# Patient Record
Sex: FEMALE | Race: WHITE | NOT HISPANIC OR LATINO
[De-identification: names, ages, dates, MRNs, and addresses within clinical notes are randomized per-mention and may not be internally consistent; named-entity substitution may affect disease eponyms.]

---

## 2018-04-10 PROBLEM — Z00.00 ENCOUNTER FOR PREVENTIVE HEALTH EXAMINATION: Status: ACTIVE | Noted: 2018-04-10

## 2018-05-04 ENCOUNTER — APPOINTMENT (OUTPATIENT)
Dept: OTOLARYNGOLOGY | Facility: CLINIC | Age: 58
End: 2018-05-04

## 2018-06-20 ENCOUNTER — APPOINTMENT (OUTPATIENT)
Dept: OTOLARYNGOLOGY | Facility: CLINIC | Age: 58
End: 2018-06-20

## 2023-04-10 ENCOUNTER — NON-APPOINTMENT (OUTPATIENT)
Age: 63
End: 2023-04-10

## 2023-04-10 ENCOUNTER — APPOINTMENT (OUTPATIENT)
Dept: BREAST CENTER | Facility: CLINIC | Age: 63
End: 2023-04-10
Payer: COMMERCIAL

## 2023-04-10 VITALS — OXYGEN SATURATION: 94 % | SYSTOLIC BLOOD PRESSURE: 139 MMHG | HEART RATE: 108 BPM | DIASTOLIC BLOOD PRESSURE: 76 MMHG

## 2023-04-10 DIAGNOSIS — Z86.79 PERSONAL HISTORY OF OTHER DISEASES OF THE CIRCULATORY SYSTEM: ICD-10-CM

## 2023-04-10 DIAGNOSIS — Z86.69 PERSONAL HISTORY OF OTHER DISEASES OF THE NERVOUS SYSTEM AND SENSE ORGANS: ICD-10-CM

## 2023-04-10 DIAGNOSIS — Z86.59 PERSONAL HISTORY OF OTHER MENTAL AND BEHAVIORAL DISORDERS: ICD-10-CM

## 2023-04-10 DIAGNOSIS — Z80.3 FAMILY HISTORY OF MALIGNANT NEOPLASM OF BREAST: ICD-10-CM

## 2023-04-10 DIAGNOSIS — Z78.9 OTHER SPECIFIED HEALTH STATUS: ICD-10-CM

## 2023-04-10 PROCEDURE — 99204 OFFICE O/P NEW MOD 45 MIN: CPT

## 2023-04-10 NOTE — HISTORY OF PRESENT ILLNESS
[FreeTextEntry1] : The patient is a 62-year-old  postmenopausal white female of Ashkenazi Religious heritage.  She underwent menarche at age 14 and had her first child at age 35.  She underwent menopause at age 53 and never took any hormone replacement therapy.  She has a family history with her mother had breast cancer in her mid 60s with later recurrence at age 79.  Her brother had melanoma at age 59.  The patient has a significant medical history of anxiety as well as significant balance and vertigo disorder which has been attributed to possible Ménière's disease.  She felt a palpable mass around the 12:00 periareolar border of the right breast nipple areolar complex in 2023 and underwent a bilateral mammography and ultrasound around 2023 at [facility] which showed a suspicious 2.5 x 1.6 x 1.5 cm mass for which she underwent an ultrasound-guided core biopsy on 2023.  The final pathology showed an intermediate grade invasive duct cancer which was ER moderately positive between 81 and 90% and [place] weak to moderately positive greater than 95% and HER2/gato negative by IHC with a Ki-67 of 11%.  She could not tolerate an MRI and was seen down at [place] by Dr. Diego Wheeler.  He did perform a directed ultrasound in the patient's right breast upper outer quadrant for palpable density which turned out to be a benign skin finding.  She was then scheduled for a right breast partial mastectomy and sentinel lymph node biopsy and had a lymphoscintigraphy the day before the surgery but on the day of surgery she was canceled due to the lack of cardiology clearance. She did undergo genetic testing in 2023 with [facility] which was completely negative.  She comes in now for a surgical second opinion.

## 2023-04-10 NOTE — ASSESSMENT
[FreeTextEntry1] : The patient is a 62-year-old  postmenopausal white female of Ashkenazi Anglican heritage.  She underwent menarche at age 14 and had her first child at age 35.  She underwent menopause at age 53 and never took any hormone replacement therapy.  She has a family history with her mother had breast cancer in her mid 60s with later recurrence at age 79.  Her brother had melanoma at age 59.  The patient has a significant medical history of anxiety as well as significant balance and vertigo disorder which has been attributed to possible Ménière's disease.  She felt a palpable mass around the 12:00 periareolar border of the right breast nipple areolar complex in 2023 and underwent a bilateral mammography and ultrasound around 2023 at [facility] which showed a suspicious 2.5 x 1.6 x 1.5 cm mass for which she underwent an ultrasound-guided core biopsy on 2023.  The final pathology showed an intermediate grade invasive duct cancer which was ER moderately positive between 81 and 90% and [place] weak to moderately positive greater than 95% and HER2/gato negative by IHC with a Ki-67 of 11%.  She could not tolerate an MRI and was seen down at [place] by Dr. Diego Wheeler.  He did perform a directed ultrasound in the patient's right breast upper outer quadrant for palpable density which turned out to be a benign skin finding.  She was then scheduled for a right breast partial mastectomy and sentinel lymph node biopsy and had a lymphoscintigraphy the day before the surgery but on the day of surgery she was canceled due to the lack of cardiology clearance.  Unfortunately, the patient only brought in the CD-ROM from [place] which is showed the lymphoscintigraphy and the ultrasound of this benign right breast skin density.  She will obtain the CD-ROM from [facility] from her initial mammography and ultrasound and core biopsy with clip placement.  I did review the pathology showing the moderately differentiated invasive duct cancer which was ER/[place] positive HER2/gato negative.  We will get the slides for slide review.  On exam, this right breast periareolar mass is easily palpable but no other suspicious findings.  I spoke to the patient and her  at length regarding this newly diagnosed cancer.  Because she is changing surgeons it will take some time to get all her prior images and get her rescheduled which will take at least 2 weeks.  The patient definitely does not want to go back to [place] however.  She does have significant anxiety with dizziness and vertigo and I agree that she requires a cardiac clearance preoperatively and she has an appointment to see a cardiologist at [facility].  It is unlikely I can get an appointment any earlier here.  She did undergo genetic testing in 2023 with [facility] which was completely negative.  She is unable to tolerate an MRI and I think even a contrast-enhanced mammography would be difficult for her.  I agree with Dr. Wheeler down to [place] that a partial mastectomy with sentinel lymph node biopsy would be the best option for her.  I see no need for further diagnostic imaging but I would place a preoperative localization clip in the cancer just to be sure to get good margins.  I will review her mammography and ultrasound from [facility] soon as we get it and also make sure that we have a slide review on the pathology.  The patient understands and agrees to proceed as planned.  She understands the need for endocrine therapy and a medical oncology evaluation postoperatively.  She also understands the need for radiation therapy after breast conserving surgery.  We will go ahead and start looking at surgical dates and work on her preoperative clearance at the same time.  All their questions were answered and they have a full understanding of the proposed surgery as well as risks and complications.

## 2023-04-10 NOTE — REASON FOR VISIT
[Initial Evaluation] : an initial evaluation [FreeTextEntry1] : The patient is a postmenopausal white female of Ashkenazi Pentecostal descent.  She has a family history of breast cancer with her mother had breast cancer in her 60s and a recurrence at age 79.  The patient has past medical history significant for significant balance and vertigo issues possibly secondary to Ménière's disease.  She felt a palpable mass in the right breast 12:00 periareolar region around February 2023.  She underwent a bilateral mammography at [facility] on February 7, 2023 with an ultrasound which showed architectural distortion in the right breast periareolar region and ultrasound showed an irregular 2.5 x 1.6 x 1.5 cm density in the right breast 12:00 region and core biopsy was performed on February 22, 2023 showing an intermediate grade invasive duct cancer which is ER/[place] positive HER2/gato negative with a Ki-67 of 11%.  She was initially scheduled for surgery down to [place] with Diego Wheeler but that surgery was canceled due to the lack of cardiology clearance.  She comes in now for a surgical second opinion.

## 2023-04-10 NOTE — ADDENDUM
[FreeTextEntry1] : I spent greater than 75% of consultation in face-to-face counseling and coordination of care for this newly diagnosed right breast periareolar invasive duct breast cancer.

## 2023-04-10 NOTE — PHYSICAL EXAM
[Normocephalic] : normocephalic [Atraumatic] : atraumatic [EOMI] : extra ocular movement intact [Supple] : supple [No Supraclavicular Adenopathy] : no supraclavicular adenopathy [No Cervical Adenopathy] : no cervical adenopathy [Examined in the supine and seated position] : examined in the supine and seated position [No dominant masses] : no dominant masses left breast [No Nipple Retraction] : no left nipple retraction [No Nipple Discharge] : no left nipple discharge [Breast Mass Left Breast ___cm] : no masses [No Axillary Lymphadenopathy] : no left axillary lymphadenopathy [No Edema] : no edema [Breast Nipple Inversion] : nipples not inverted [Breast Nipple Retraction] : nipples not retracted [Breast Nipple Flattening] : nipples not flattened [Breast Nipple Fissures] : nipples not fissured [Breast Abnormal Lactation (Galactorrhea)] : no galactorrhea [Breast Abnormal Secretion Bloody Fluid] : no bloody discharge [Breast Abnormal Secretion Serous Fluid] : no serous discharge [Breast Abnormal Secretion Opalescent Fluid] : no milky discharge [de-identified] : On exam, the patient has ptotic double D-cup breasts.  On palpation, she is about a 2 cm mass in the right breast 12:00 periareolar region which is irregular over the area of known cancer.  She has a palpable superficial subcutaneous density in the right breast 1-2 o'clock region about 12 cm from the nipple consistent with a benign skin density which was seen on ultrasound.  I cannot feel any other suspicious densities and she has no axillary, supraclavicular, or cervical adenopathy.  She does have a significant fungal skin rash in between her breasts and in the inframammary regions. [de-identified] : Right breast 12:00 periareolar breast mass not causing any skin dimpling or nipple retraction consistent with the known primary cancer.  Separate right breast high upper inner quadrant skin density which is benign on ultrasound. [de-identified] : 2 cm right breast periareolar irregular breast mass consistent with the known cancer. [de-identified] : Medial and inframammary fold skin rash consistent with fungal infection currently being treated with clotrimazole and betamethasone.

## 2023-04-10 NOTE — REVIEW OF SYSTEMS
[Eyes Itch] : itching of the eyes [Breast Lump] : breast lump [Dizziness] : dizziness [Difficulty Walking] : difficulty walking [Anxiety] : anxiety [Negative] : Endocrine [Feeling Tired] : not feeling tired [FreeTextEntry2] : anxiety [de-identified] : balance issues  ??? vertigo ????

## 2023-04-11 ENCOUNTER — NON-APPOINTMENT (OUTPATIENT)
Age: 63
End: 2023-04-11

## 2023-04-17 ENCOUNTER — NON-APPOINTMENT (OUTPATIENT)
Age: 63
End: 2023-04-17

## 2023-04-20 ENCOUNTER — NON-APPOINTMENT (OUTPATIENT)
Age: 63
End: 2023-04-20

## 2023-04-21 ENCOUNTER — NON-APPOINTMENT (OUTPATIENT)
Age: 63
End: 2023-04-21

## 2023-05-01 ENCOUNTER — RESULT REVIEW (OUTPATIENT)
Age: 63
End: 2023-05-01

## 2023-05-04 ENCOUNTER — NON-APPOINTMENT (OUTPATIENT)
Age: 63
End: 2023-05-04

## 2023-05-04 ENCOUNTER — RESULT REVIEW (OUTPATIENT)
Age: 63
End: 2023-05-04

## 2023-05-07 ENCOUNTER — RESULT REVIEW (OUTPATIENT)
Age: 63
End: 2023-05-07

## 2023-05-08 ENCOUNTER — RESULT REVIEW (OUTPATIENT)
Age: 63
End: 2023-05-08

## 2023-05-08 ENCOUNTER — APPOINTMENT (OUTPATIENT)
Dept: BREAST CENTER | Facility: HOSPITAL | Age: 63
End: 2023-05-08

## 2023-05-10 ENCOUNTER — NON-APPOINTMENT (OUTPATIENT)
Age: 63
End: 2023-05-10

## 2023-05-15 ENCOUNTER — APPOINTMENT (OUTPATIENT)
Dept: BREAST CENTER | Facility: CLINIC | Age: 63
End: 2023-05-15
Payer: COMMERCIAL

## 2023-05-15 VITALS
DIASTOLIC BLOOD PRESSURE: 94 MMHG | BODY MASS INDEX: 40.65 KG/M2 | WEIGHT: 259 LBS | SYSTOLIC BLOOD PRESSURE: 164 MMHG | OXYGEN SATURATION: 95 % | HEIGHT: 67 IN | HEART RATE: 92 BPM

## 2023-05-15 PROCEDURE — 99024 POSTOP FOLLOW-UP VISIT: CPT

## 2023-05-15 NOTE — PHYSICAL EXAM
[Normocephalic] : normocephalic [Atraumatic] : atraumatic [EOMI] : extra ocular movement intact [Supple] : supple [No Supraclavicular Adenopathy] : no supraclavicular adenopathy [No Cervical Adenopathy] : no cervical adenopathy [Examined in the supine and seated position] : examined in the supine and seated position [No dominant masses] : no dominant masses left breast [No Nipple Retraction] : no left nipple retraction [No Nipple Discharge] : no left nipple discharge [No Axillary Lymphadenopathy] : no left axillary lymphadenopathy [No Edema] : no edema [No Rashes] : no rashes [No Ulceration] : no ulceration [Breast Nipple Inversion] : nipples not inverted [Breast Nipple Retraction] : nipples not retracted [Breast Nipple Flattening] : nipples not flattened [Breast Nipple Fissures] : nipples not fissured [Breast Abnormal Lactation (Galactorrhea)] : no galactorrhea [Breast Abnormal Secretion Bloody Fluid] : no bloody discharge [Breast Abnormal Secretion Serous Fluid] : no serous discharge [Breast Abnormal Secretion Opalescent Fluid] : no milky discharge [de-identified] : On exam, the patient is healing well from her right breast partial mastectomy with a slight batwing/mastopexy approach and sentinel lymph node biopsy.  Her wound is healing well but she does have some bruising but no signs of any hematoma on directed ultrasound. [de-identified] : Status post right breast partial mastectomy with slight batwing/mastopexy approach and sentinel lymph node biopsy.  Her wound is healing well with some bruising but no signs of any hematoma.

## 2023-05-15 NOTE — ASSESSMENT
[FreeTextEntry1] : The patient is a 62-year-old  postmenopausal white female of Ashkenazi Voodoo heritage.  She underwent menarche at age 14 and had her first child at age 35.  She underwent menopause at age 53 and never took any hormone replacement therapy.  She has a family history with her mother had breast cancer in her mid 60s with later recurrence at age 79.  Her brother had melanoma at age 59.  The patient has a significant medical history of anxiety as well as significant balance and vertigo disorder which has been attributed to possible Ménière's disease.  She felt a palpable mass around the 12:00 periareolar border of the right breast nipple areolar complex in 2023 and underwent a bilateral mammography and ultrasound around 2023 at [facility] which showed a suspicious 2.5 x 1.6 x 1.5 cm mass for which she underwent an ultrasound-guided core biopsy on 2023.  The final pathology showed an intermediate grade invasive duct cancer which was ER moderately positive between 81 and 90% and [place] weak to moderately positive greater than 95% and HER2/gato negative by IHC with a Ki-67 of 11%.  She could not tolerate an MRI and was seen down at [place] by Dr. Diego Wheeler.  He did perform a directed ultrasound in the patient's right breast upper outer quadrant for palpable density which turned out to be a benign skin finding.  She was then scheduled for a right breast partial mastectomy and sentinel lymph node biopsy and had a lymphoscintigraphy the day before the surgery but on the day of surgery she was canceled due to the lack of cardiology clearance.  She eventually did get her imaging on CD-ROM which was reviewed and downloaded into the PACS system showing the localized right breast 12:00 periareolar cancer.  I did have the pathology reviewed at [facility] which showed the moderately differentiated invasive duct cancer which was ER/TN positive HER2/gato negative. She did undergo genetic testing in 2023 with [facility] which was completely negative. She was seen as a second opinion and was rescheduled for her surgery at [facility] after undergoing preoperative and cardiology clearance.  She underwent a right breast partial mastectomy and sentinel lymph node biopsy with slight mastopexy with a batwing/tissue transfer closure performed on May 8, 2023.  The final pathology showed a 2.3 cm intermediate grade invasive duct cancer with 2 negative right axillary sentinel lymph nodes with free margins which remained ER/TN positive HER2/gato negative with a Ki-67 between 10 and 15%.  This is a pathologic prognostic stage IA breast cancer. On exam today, she is healing well from her right breast partial mastectomy with a batwing mastopexy approach. She does have some bruising and ecchymosis but no signs of any hematoma on directed ultrasound.  I went over the pathology with the patient today and gave her a copy of the report for her records.  The tumor was sent out for an Oncotype recurrence score.  She understands the benefits for endocrine therapy as well as the benefits for radiation therapy.  She was told to make appointments with Dr. Al for a medical oncology evaluation as well as Dr. Alfaro for a radiation oncology evaluation.  She should follow-up again in another 2 weeks for another wound evaluation.  She is a possible candidate for the NRG B007 trial.

## 2023-05-15 NOTE — HISTORY OF PRESENT ILLNESS
[FreeTextEntry1] : The patient is a 62-year-old  postmenopausal white female of Ashkenazi Sikh heritage.  She underwent menarche at age 14 and had her first child at age 35.  She underwent menopause at age 53 and never took any hormone replacement therapy.  She has a family history with her mother had breast cancer in her mid 60s with later recurrence at age 79.  Her brother had melanoma at age 59.  The patient has a significant medical history of anxiety as well as significant balance and vertigo disorder which has been attributed to possible Ménière's disease.  She felt a palpable mass around the 12:00 periareolar border of the right breast nipple areolar complex in 2023 and underwent a bilateral mammography and ultrasound around 2023 at [facility] which showed a suspicious 2.5 x 1.6 x 1.5 cm mass for which she underwent an ultrasound-guided core biopsy on 2023.  The final pathology showed an intermediate grade invasive duct cancer which was ER moderately positive between 81 and 90% and [place] weak to moderately positive greater than 95% and HER2/gato negative by IHC with a Ki-67 of 11%.  She could not tolerate an MRI and was seen down at [place] by Dr. Diego Wheeler.  He did perform a directed ultrasound in the patient's right breast upper outer quadrant for palpable density which turned out to be a benign skin finding.  She was then scheduled for a right breast partial mastectomy and sentinel lymph node biopsy and had a lymphoscintigraphy the day before the surgery but on the day of surgery she was canceled due to the lack of cardiology clearance. She did undergo genetic testing in 2023 with [facility] which was completely negative.  She was seen as a second opinion and was rescheduled for her surgery at [facility] after undergoing preoperative and cardiology clearance.  She underwent a right breast partial mastectomy and sentinel lymph node biopsy with slight mastopexy with a batwing/tissue transfer closure performed on May 8, 2023.   The final pathology showed a 2.3 cm intermediate grade invasive duct cancer with 2 negative right axillary sentinel lymph nodes with free margins which remained ER/ND positive HER2/gato negative with a Ki-67 between 10 and 15%.  Oncotype recurrence score is pending.  She comes in now for postop follow-up.

## 2023-05-15 NOTE — REVIEW OF SYSTEMS
[Eyes Itch] : itching of the eyes [Breast Lump] : breast lump [Dizziness] : dizziness [Difficulty Walking] : difficulty walking [Anxiety] : anxiety [Negative] : Endocrine [Feeling Tired] : not feeling tired [FreeTextEntry2] : anxiety [de-identified] : balance issues  ??? vertigo ????

## 2023-05-15 NOTE — REASON FOR VISIT
[Post Op: _________] : a [unfilled] post op visit [FreeTextEntry1] : The patient is a postmenopausal white female of Ashkenazi [place]ze descent.  She has a family history of breast cancer with her mother had breast cancer in her 60s and a recurrence at age 79.  The patient has past medical history significant for significant balance and vertigo issues possibly secondary to Ménière's disease.  She felt a palpable mass in the right breast 12:00 periareolar region around February 2023.  She underwent a bilateral mammography at [facility] on February 7, 2023 with an ultrasound which showed architectural distortion in the right breast periareolar region and ultrasound showed an irregular 2.5 x 1.6 x 1.5 cm density in the right breast 12:00 region and core biopsy was performed on February 22, 2023 showing an intermediate grade invasive duct cancer which is ER/LA positive HER2/gato negative with a Ki-67 of 11%.  She was initially scheduled for surgery down to [place] with Diego Wheeler but that surgery was canceled due to the lack of cardiology clearance.  She underwent cardiology and preop clearance and then underwent a right breast partial mastectomy and sentinel lymph node biopsy with slight batwing mastopexy and tissue transfer closure performed on May 8, 2023.

## 2023-05-22 ENCOUNTER — NON-APPOINTMENT (OUTPATIENT)
Age: 63
End: 2023-05-22

## 2023-05-24 ENCOUNTER — NON-APPOINTMENT (OUTPATIENT)
Age: 63
End: 2023-05-24

## 2023-05-25 ENCOUNTER — NON-APPOINTMENT (OUTPATIENT)
Age: 63
End: 2023-05-25

## 2023-06-01 ENCOUNTER — TRANSCRIPTION ENCOUNTER (OUTPATIENT)
Age: 63
End: 2023-06-01

## 2023-06-01 ENCOUNTER — APPOINTMENT (OUTPATIENT)
Dept: BREAST CENTER | Facility: CLINIC | Age: 63
End: 2023-06-01
Payer: COMMERCIAL

## 2023-06-01 VITALS — BODY MASS INDEX: 40.57 KG/M2 | HEART RATE: 100 BPM | WEIGHT: 259 LBS | OXYGEN SATURATION: 96 %

## 2023-06-01 PROCEDURE — 99024 POSTOP FOLLOW-UP VISIT: CPT

## 2023-06-01 NOTE — REASON FOR VISIT
[Post Op: _________] : a [unfilled] post op visit [FreeTextEntry1] : The patient is a postmenopausal white female of Ashkenazi Hinduism descent.  She has a family history of breast cancer with her mother had breast cancer in her 60s and a recurrence at age 79.  The patient has past medical history significant for significant balance and vertigo issues possibly secondary to Ménière's disease.  She felt a palpable mass in the right breast 12:00 periareolar region around February 2023.  She underwent a bilateral mammography at [facility] on February 7, 2023 with an ultrasound which showed architectural distortion in the right breast periareolar region and ultrasound showed an irregular 2.5 x 1.6 x 1.5 cm density in the right breast 12:00 region and core biopsy was performed on February 22, 2023 showing an intermediate grade invasive duct cancer which is ER/[place] positive HER2/gato negative with a Ki-67 of 11%.  She was initially scheduled for surgery down to [place] with Diego Wheeler but that surgery was canceled due to the lack of cardiology clearance.  She underwent cardiology and preop clearance and then underwent a right breast partial mastectomy and sentinel lymph node biopsy with slight batwing mastopexy and tissue transfer closure performed on May 8, 2023.  Final pathology showed a 2.3 cm intermediate grade invasive duct cancer with 2 negative axillary sentinel lymph nodes and free margins which remained ER/IL positive HER2/gato negative with a Ki-67 between 10 and 15%.  Oncotype score was 11.  She understands the need for endocrine therapy and has an appointment to see Dr. Al.  She comes in now for postop follow-up.

## 2023-06-01 NOTE — HISTORY OF PRESENT ILLNESS
[FreeTextEntry1] : The patient is a 62-year-old  postmenopausal white female of Ashkenazi Mandaen heritage.  She underwent menarche at age 14 and had her first child at age 35.  She underwent menopause at age 53 and never took any hormone replacement therapy.  She has a family history with her mother had breast cancer in her mid 60s with later recurrence at age 79.  Her brother had melanoma at age 59.  The patient has a significant medical history of anxiety as well as significant balance and vertigo disorder which has been attributed to possible Ménière's disease.  She felt a palpable mass around the 12:00 periareolar border of the right breast nipple areolar complex in 2023 and underwent a bilateral mammography and ultrasound around 2023 at [facility] which showed a suspicious 2.5 x 1.6 x 1.5 cm mass for which she underwent an ultrasound-guided core biopsy on 2023.  The final pathology showed an intermediate grade invasive duct cancer which was ER moderately positive between 81 and 90% and [place] weak to moderately positive greater than 95% and HER2/gato negative by IHC with a Ki-67 of 11%.  She could not tolerate an MRI and was seen down at [place] by Dr. Diego Wheeler.  He did perform a directed ultrasound in the patient's right breast upper outer quadrant for palpable density which turned out to be a benign skin finding.  She was then scheduled for a right breast partial mastectomy and sentinel lymph node biopsy and had a lymphoscintigraphy the day before the surgery but on the day of surgery she was canceled due to the lack of cardiology clearance. She did undergo genetic testing in 2023 with [facility] which was completely negative.  She was seen as a second opinion and was rescheduled for her surgery at [facility] after undergoing preoperative and cardiology clearance.  She underwent a right breast partial mastectomy and sentinel lymph node biopsy with slight mastopexy with a batwing/tissue transfer closure performed on May 8, 2023.   The final pathology showed a 2.3 cm intermediate grade invasive duct cancer with 2 negative right axillary sentinel lymph nodes with free margins which remained ER/[place] positive HER2/gato negative with a Ki-67 between 10 and 15%.  Oncotype recurrence score was 11 and she has a follow-up appointment with Dr. Al and Dr. Alfaro..  She comes in now for postop follow-up.

## 2023-06-01 NOTE — PHYSICAL EXAM
[Normocephalic] : normocephalic [Atraumatic] : atraumatic [EOMI] : extra ocular movement intact [Supple] : supple [No Supraclavicular Adenopathy] : no supraclavicular adenopathy [No Cervical Adenopathy] : no cervical adenopathy [Examined in the supine and seated position] : examined in the supine and seated position [No dominant masses] : no dominant masses left breast [No Nipple Retraction] : no left nipple retraction [No Nipple Discharge] : no left nipple discharge [No Axillary Lymphadenopathy] : no left axillary lymphadenopathy [No Edema] : no edema [No Ulceration] : no ulceration [Breast Nipple Inversion] : nipples not inverted [Breast Nipple Retraction] : nipples not retracted [Breast Nipple Flattening] : nipples not flattened [Breast Nipple Fissures] : nipples not fissured [Breast Abnormal Lactation (Galactorrhea)] : no galactorrhea [Breast Abnormal Secretion Bloody Fluid] : no bloody discharge [Breast Abnormal Secretion Serous Fluid] : no serous discharge [Breast Abnormal Secretion Opalescent Fluid] : no milky discharge [de-identified] : On exam, the patient is healing well from her right breast partial mastectomy with a slight batwing/mastopexy approach and sentinel lymph node biopsy.  Her wound is healing well but she does have a significant fungal rash in the inframammary folds of both breasts. [de-identified] : Status post right breast partial mastectomy with slight batwing/mastopexy approach and sentinel lymph node biopsy.  Her wound is healing well she does have an inframammary fungal rash and will start using betamethasone clotrimazole cream. [de-identified] : Inframammary fungal rash for which she will start using betamethasone clotrimazole cream [de-identified] : significant bilateral inframammary fungal rash

## 2023-06-01 NOTE — ASSESSMENT
[FreeTextEntry1] : The patient is a 62-year-old  postmenopausal white female of Ashkenazi Denominational heritage.  She underwent menarche at age 14 and had her first child at age 35.  She underwent menopause at age 53 and never took any hormone replacement therapy.  She has a family history with her mother had breast cancer in her mid 60s with later recurrence at age 79.  Her brother had melanoma at age 59.  The patient has a significant medical history of anxiety as well as significant balance and vertigo disorder which has been attributed to possible Ménière's disease.  She felt a palpable mass around the 12:00 periareolar border of the right breast nipple areolar complex in 2023 and underwent a bilateral mammography and ultrasound around 2023 at [facility] which showed a suspicious 2.5 x 1.6 x 1.5 cm mass for which she underwent an ultrasound-guided core biopsy on 2023.  The final pathology showed an intermediate grade invasive duct cancer which was ER moderately positive between 81 and 90% and DC weak to moderately positive greater than 95% and HER2/gato negative by IHC with a Ki-67 of 11%.  She could not tolerate an MRI and was seen down at [place] by Dr. Diego Wheeler.  He did perform a directed ultrasound in the patient's right breast upper outer quadrant for palpable density which turned out to be a benign skin finding.  She was then scheduled for a right breast partial mastectomy and sentinel lymph node biopsy and had a lymphoscintigraphy the day before the surgery but on the day of surgery she was canceled due to the lack of cardiology clearance.  She eventually did get her imaging on CD-ROM which was reviewed and downloaded into the PACS system showing the localized right breast 12:00 periareolar cancer.  I did have the pathology reviewed at [facility] which showed the moderately differentiated invasive duct cancer which was ER/DC positive HER2/gato negative. She did undergo genetic testing in 2023 with [facility] which was completely negative. She was seen as a second opinion and was rescheduled for her surgery at [facility] after undergoing preoperative and cardiology clearance.  She underwent a right breast partial mastectomy and sentinel lymph node biopsy with slight mastopexy with a batwing/tissue transfer closure performed on May 8, 2023.  The final pathology showed a 2.3 cm intermediate grade invasive duct cancer with 2 negative right axillary sentinel lymph nodes with free margins which remained ER/DC positive HER2/gato negative with a Ki-67 between 10 and 15%.  This was a pathologic prognostic stage IA breast cancer. On exam today, she is healing well from her right breast partial mastectomy with a batwing mastopexy approach. She does have a significant fungal rash on the inframammary folds of both breasts and she was told to use the betamethasone clotrimazole cream on this and to try to stay in a bra..  The tumor was sent out for an Oncotype recurrence score which came back at 11.  She understands the benefits for endocrine therapy as well as the benefits for radiation therapy.  She was told to make appointments with Dr. Al for a medical oncology evaluation as well as Dr. Alfaro for a radiation oncology evaluation.  She should follow-up again in another 2 weeks for another wound evaluation.  She is a possible candidate for the G [id number] trial.  I would like to see her again in another 3 months.  Her next routine bilateral mammography and ultrasound will be due in 2024.

## 2023-06-01 NOTE — REVIEW OF SYSTEMS
[Eyes Itch] : itching of the eyes [Breast Lump] : breast lump [Dizziness] : dizziness [Difficulty Walking] : difficulty walking [Anxiety] : anxiety [Negative] : Endocrine [Feeling Tired] : not feeling tired [FreeTextEntry2] : anxiety [de-identified] : balance issues  ??? vertigo ????

## 2023-06-08 ENCOUNTER — NON-APPOINTMENT (OUTPATIENT)
Age: 63
End: 2023-06-08

## 2023-06-12 ENCOUNTER — NON-APPOINTMENT (OUTPATIENT)
Age: 63
End: 2023-06-12

## 2023-06-12 ENCOUNTER — APPOINTMENT (OUTPATIENT)
Dept: HEMATOLOGY ONCOLOGY | Facility: CLINIC | Age: 63
End: 2023-06-12
Payer: COMMERCIAL

## 2023-06-12 ENCOUNTER — APPOINTMENT (OUTPATIENT)
Dept: RADIATION ONCOLOGY | Facility: CLINIC | Age: 63
End: 2023-06-12
Payer: COMMERCIAL

## 2023-06-12 VITALS
WEIGHT: 263 LBS | DIASTOLIC BLOOD PRESSURE: 82 MMHG | OXYGEN SATURATION: 95 % | RESPIRATION RATE: 18 BRPM | HEART RATE: 84 BPM | TEMPERATURE: 98.4 F | BODY MASS INDEX: 41.81 KG/M2 | SYSTOLIC BLOOD PRESSURE: 146 MMHG

## 2023-06-12 VITALS
DIASTOLIC BLOOD PRESSURE: 82 MMHG | BODY MASS INDEX: 41.77 KG/M2 | WEIGHT: 263 LBS | TEMPERATURE: 98.4 F | OXYGEN SATURATION: 95 % | RESPIRATION RATE: 18 BRPM | SYSTOLIC BLOOD PRESSURE: 146 MMHG | HEIGHT: 66.5 IN | HEART RATE: 84 BPM

## 2023-06-12 DIAGNOSIS — Z78.9 OTHER SPECIFIED HEALTH STATUS: ICD-10-CM

## 2023-06-12 DIAGNOSIS — Z80.8 FAMILY HISTORY OF MALIGNANT NEOPLASM OF OTHER ORGANS OR SYSTEMS: ICD-10-CM

## 2023-06-12 DIAGNOSIS — Z87.2 PERSONAL HISTORY OF DISEASES OF THE SKIN AND SUBCUTANEOUS TISSUE: ICD-10-CM

## 2023-06-12 PROCEDURE — 99203 OFFICE O/P NEW LOW 30 MIN: CPT

## 2023-06-12 PROCEDURE — 99204 OFFICE O/P NEW MOD 45 MIN: CPT | Mod: 25

## 2023-06-12 RX ORDER — LORATADINE 5 MG/5 ML
SOLUTION, ORAL ORAL
Refills: 0 | Status: ACTIVE | COMMUNITY

## 2023-06-12 RX ORDER — LORAZEPAM 2 MG/1
TABLET ORAL
Refills: 0 | Status: ACTIVE | COMMUNITY

## 2023-06-12 RX ORDER — METOPROLOL TARTRATE 100 MG/1
100 TABLET, FILM COATED ORAL
Refills: 0 | Status: ACTIVE | COMMUNITY

## 2023-06-12 NOTE — REVIEW OF SYSTEMS
[Dizziness] : dizziness [Negative] : Allergic/Immunologic [de-identified] : Patient has Meniere's disease causing chronic dizziness

## 2023-06-12 NOTE — VITALS
[Maximal Pain Intensity: 0/10] : 0/10 [Date: ____________] : Patient's last distress assessment performed on [unfilled]. [3 - Distress Level] : Distress Level: 3 [70: Cares for self; unalbe to carry on normal activity or do active work.] : 70: Cares for self; unable to carry on normal activity or do active work.

## 2023-06-12 NOTE — HISTORY OF PRESENT ILLNESS
[FreeTextEntry1] : Ms. Mccoy is a 62-year-old woman with right breast cancer.\par \par She felt a palpable mass around the 12:00 periareolar border of the right breast nipple areolar complex in February 2023.\par \par 2/7/23 bilateral mammography and ultrasound: showed a 1.3 cm mass right retroareolar region for which she underwent an ultrasound-guided core biopsy on February 22, 2023. \par \par The final pathology showed an intermediate grade invasive duct cancer which was ER moderately positive between 81 and 90% and AR weak to moderately positive greater than 95% and HER2/gato negative by IHC with a Ki-67 of 11%.\par \par She could not tolerate an MRI so this was not done. Genetic testing was negative.\par \par She underwent a right breast partial mastectomy and sentinel lymph node biopsy with slight mastopexy on May 8, 2023. \par \par The final pathology showed a 2.3 cm, Grade 2, invasive duct cancer with 2 negative right axillary sentinel lymph nodes with free margins which remained ER/AR positive HER2/gato negative with a Ki-67 between 10 and 15%. \par \par Oncotype recurrence score was 11\par \par 6/12/2023  today she is here  to discuss post operative radiotherapy options.  Dr Jessica recommended starting anastrazole after completion of adjuvant RT.

## 2023-06-12 NOTE — PHYSICAL EXAM
[Sclera] : the sclera and conjunctiva were normal [] : no respiratory distress [Breast Palpation Mass] : no palpable masses [Normal] : no palpable adenopathy [Musculoskeletal - Swelling] : no joint swelling [Skin Color & Pigmentation] : normal skin color and pigmentation [No Focal Deficits] : no focal deficits [Oriented To Time, Place, And Person] : oriented to person, place, and time [de-identified] : Symmetric, pendulous breasts with well healed incision at 12 o'clock in the right breast  [de-identified] : Well healed axillary incision right axilla

## 2023-06-18 NOTE — PHYSICAL EXAM
[Restricted in physically strenuous activity but ambulatory and able to carry out work of a light or sedentary nature] : Status 1- Restricted in physically strenuous activity but ambulatory and able to carry out work of a light or sedentary nature, e.g., light house work, office work [Normal] : affect appropriate [de-identified] : Rt. breast lumpectomy incision healing well. LEft breast --nl. No axillary adenopathy b/l

## 2023-06-18 NOTE — ASSESSMENT
[FreeTextEntry1] : Ms. Mccoy is a 62-year-old postmenopausal female who is referred by Dr. Arjun Kyle for initial consultation for breast cancer.  \par Patient reports that she palpated a mass in the right breast approximately 3 months ago.  Bilateral mammography on 2/7/2023 showed a suspicious 2.5 x 1.6 x 1.5 cm mass in the right breast. \par  Ultrasound-guided core biopsy on 2/22/2023 showed an intermediate grade invasive ductal carcinoma ER moderately positive (81 to 90%) MA weak to moderately positive (greater than 95% and HER2/gato negative by IHC and Ki-67 11%.  She presented to [place] for surgical opinion which confirmed that the palpable lesion in the right breast was a benign skin finding.  She was scheduled for right partial mastectomy and sentinel lymph node biopsy but was her surgery was canceled by anesthesiology because of finding of? right axis deviation on ECG. \par  She presented to [facility] for surgical second opinion on and on 5/8/2023 underwent right partial mastectomy and sentinel lymph node biopsy.  Pathology showed a 2.3 cm intermediate grade invasive ductal carcinoma with apocrine and micropapillary features, No LVI, ,2 negative right axillary sentinel lymph nodes with free margins ER 81-90% moderate /MA >95% strong  positive HER2/gato 1+/FISH negative, Ki-67 between 10 to 15%.  Oncotype recurrence score was 11. \par \par Genetic testing -\par \par pT2, N0 , IDC with apocrine and micropapillary features, G2, ER  81-90% moderate, MA>95%, Her2-1+/FISH neg., Ki67-10-15%, oncotype DX RS: 11\par \par Plan:\par \par f/u with rad-onc to discuss  trial\par discussed about side effect profile of AI. discussed about potential use of adjuvant CDK 4/6 inhibitor\par f/u after RT decision\par \par \par

## 2023-06-18 NOTE — REVIEW OF SYSTEMS
[Diarrhea: Grade 0] : Diarrhea: Grade 0 [Difficulty Walking] : difficulty walking [Anxiety] : anxiety [Negative] : Allergic/Immunologic [FreeTextEntry2] : She is attempting to lose weight [de-identified] : Disequilibrium from Ménière's disease.  Uses a wheelchair when out of the house

## 2023-06-18 NOTE — HISTORY OF PRESENT ILLNESS
[de-identified] : Ms. Mccoy is a 62-year-old postmenopausal female who is referred by Dr. Arjun Kyle for initial consultation for breast cancer.  \par Patient reports that she palpated a mass in the right breast approximately 3 months ago.  Bilateral mammography on 2/7/2023 showed a suspicious 2.5 x 1.6 x 1.5 cm mass in the right breast. \par  Ultrasound-guided core biopsy on 2/22/2023 showed an intermediate grade invasive ductal carcinoma ER moderately positive (81 to 90%) OK weak to moderately positive (greater than 95% and HER2/gato negative by IHC and Ki-67 11%.  She presented to [place] for surgical opinion which confirmed that the palpable lesion in the right breast was a benign skin finding.  She was scheduled for right partial mastectomy and sentinel lymph node biopsy but was her surgery was canceled by anesthesiology because of finding of? right axis deviation on ECG. \par  She presented to [facility] for surgical second opinion on and on 5/8/2023 underwent right partial mastectomy and sentinel lymph node biopsy.  Pathology showed a 2.3 cm intermediate grade invasive ductal carcinoma with apocrine and micropapillary features, No LVI, ,2 negative right axillary sentinel lymph nodes with free margins ER 81-90% moderate /OK >95% strong  positive HER2/gato 1+/FISH negative, Ki-67 between 10 to 15%.  Oncotype recurrence score was 11. \par \par  She underwent genetic testing in March 2023 with [facility] which was reported as negative.  Her family history is significant for mother with breast cancer diagnosed in her early 60s with recurrence at age 79.  Her brother also had a history of melanoma from which she passed away at age 59.  She is of Ashkenazi Congregational ancestry.  [de-identified] : PAtient with h/o MEnieres disease

## 2023-06-26 ENCOUNTER — APPOINTMENT (OUTPATIENT)
Dept: HEMATOLOGY ONCOLOGY | Facility: CLINIC | Age: 63
End: 2023-06-26
Payer: COMMERCIAL

## 2023-06-26 PROCEDURE — 99441: CPT

## 2023-07-02 NOTE — ASSESSMENT
[FreeTextEntry1] : Ms. Mccoy is a 62-year-old postmenopausal female who is referred by Dr. Arjun Kyle for initial consultation for breast cancer.  \par Patient reports that she palpated a mass in the right breast approximately 3 months ago.  Bilateral mammography on 2/7/2023 showed a suspicious 2.5 x 1.6 x 1.5 cm mass in the right breast. \par  Ultrasound-guided core biopsy on 2/22/2023 showed an intermediate grade invasive ductal carcinoma ER moderately positive (81 to 90%) PA weak to moderately positive (greater than 95% and HER2/gato negative by IHC and Ki-67 11%.  She presented to [place] for surgical opinion which confirmed that the palpable lesion in the right breast was a benign skin finding.  She was scheduled for right partial mastectomy and sentinel lymph node biopsy but was her surgery was canceled by anesthesiology because of finding of? right axis deviation on ECG. \par  She presented to [facility] for surgical second opinion on and on 5/8/2023 underwent right partial mastectomy and sentinel lymph node biopsy.  Pathology showed a 2.3 cm intermediate grade invasive ductal carcinoma with apocrine and micropapillary features, No LVI, ,2 negative right axillary sentinel lymph nodes with free margins ER 81-90% moderate /PA >95% strong  positive HER2/gato 1+/FISH negative, Ki-67 between 10 to 15%.  Oncotype recurrence score was 11. \par \par Genetic testing -\par \par pT2, N0 , IDC with apocrine and micropapillary features, G2, ER  81-90% moderate, PA>95%, Her2-1+/FISH neg., Ki67-10-15%, oncotype DX RS: 11\par \par Plan:\par \par f/u with rad-onc  for adjuvant RT\par discussed about side effect profile of AI and ribociclib \par f/u after RT  for further discussion of  adjuvant therapy \par \par \par

## 2023-07-02 NOTE — REVIEW OF SYSTEMS
[Diarrhea: Grade 0] : Diarrhea: Grade 0 [Difficulty Walking] : difficulty walking [Anxiety] : anxiety [Negative] : Allergic/Immunologic [FreeTextEntry2] : She is attempting to lose weight [de-identified] : Disequilibrium from Ménière's disease.  Uses a wheelchair when out of the house

## 2023-07-02 NOTE — HISTORY OF PRESENT ILLNESS
[de-identified] : Ms. Mccoy is a 62-year-old postmenopausal female who is referred by Dr. Arjun Kyle for initial consultation for breast cancer.  \par Patient reports that she palpated a mass in the right breast approximately 3 months ago.  Bilateral mammography on 2/7/2023 showed a suspicious 2.5 x 1.6 x 1.5 cm mass in the right breast. \par  Ultrasound-guided core biopsy on 2/22/2023 showed an intermediate grade invasive ductal carcinoma ER moderately positive (81 to 90%) OH weak to moderately positive (greater than 95% and HER2/gato negative by IHC and Ki-67 11%.  She presented to [place] for surgical opinion which confirmed that the palpable lesion in the right breast was a benign skin finding.  She was scheduled for right partial mastectomy and sentinel lymph node biopsy but was her surgery was canceled by anesthesiology because of finding of? right axis deviation on ECG. \par  She presented to [facility] for surgical second opinion on and on 5/8/2023 underwent right partial mastectomy and sentinel lymph node biopsy.  Pathology showed a 2.3 cm intermediate grade invasive ductal carcinoma with apocrine and micropapillary features, No LVI, ,2 negative right axillary sentinel lymph nodes with free margins ER 81-90% moderate /OH >95% strong  positive HER2/agto 1+/FISH negative, Ki-67 between 10 to 15%.  Oncotype recurrence score was 11. \par \par  She underwent genetic testing in March 2023 with [facility] which was reported as negative.  Her family history is significant for mother with breast cancer diagnosed in her early 60s with recurrence at age 79.  Her brother also had a history of melanoma from which she passed away at age 59.  She is of Ashkenazi Catholic ancestry.  [de-identified] : Patient with h/o Menieres disease\par \par Discussed [facility] trial with adjuvant ribociclib , anatomic stage II ( T2, N0, G2) -- adjuvant ribociclib for 3 yrs. in addition to endocrine therapy\par Discussed side effects of ribociclib 400 mg daily 3 weeks on/ 1 week off - neutropenia, liver toxicity, ILD, QT prolongation etc. \par

## 2023-07-02 NOTE — PHYSICAL EXAM
[Restricted in physically strenuous activity but ambulatory and able to carry out work of a light or sedentary nature] : Status 1- Restricted in physically strenuous activity but ambulatory and able to carry out work of a light or sedentary nature, e.g., light house work, office work [Normal] : affect appropriate [de-identified] : Rt. breast lumpectomy incision healing well. LEft breast --nl. No axillary adenopathy b/l

## 2023-07-26 ENCOUNTER — NON-APPOINTMENT (OUTPATIENT)
Age: 63
End: 2023-07-26

## 2023-07-26 VITALS
RESPIRATION RATE: 18 BRPM | BODY MASS INDEX: 41.77 KG/M2 | HEIGHT: 66.5 IN | SYSTOLIC BLOOD PRESSURE: 150 MMHG | HEART RATE: 98 BPM | OXYGEN SATURATION: 97 % | WEIGHT: 263 LBS | DIASTOLIC BLOOD PRESSURE: 82 MMHG

## 2023-07-26 VITALS
TEMPERATURE: 98.4 F | BODY MASS INDEX: 41.77 KG/M2 | HEIGHT: 66.5 IN | SYSTOLIC BLOOD PRESSURE: 150 MMHG | HEART RATE: 98 BPM | OXYGEN SATURATION: 97 % | WEIGHT: 263 LBS | DIASTOLIC BLOOD PRESSURE: 82 MMHG | RESPIRATION RATE: 18 BRPM

## 2023-07-26 NOTE — PHYSICAL EXAM
[General Appearance - Well Developed] : well developed [Sclera] : the sclera and conjunctiva were normal [] : no respiratory distress [Skin Color & Pigmentation] : normal skin color and pigmentation [No Focal Deficits] : no focal deficits [Oriented To Time, Place, And Person] : oriented to person, place, and time [de-identified] : An RN chaperone was offered however the patient declined to have a chaperone present during the exam.; no ckin changes or edema right breast

## 2023-07-26 NOTE — VITALS
[Maximal Pain Intensity: 0/10] : 0/10 [Least Pain Intensity: 0/10] : 0/10 [NoTreatment Scheduled] : no treatment scheduled [80: Normal activity with effort; some signs or symptoms of disease.] : 80: Normal activity with effort; some signs or symptoms of disease.

## 2023-07-26 NOTE — DISEASE MANAGEMENT
[TTNM] : 2 [NTNM] : 0 [MTNM] : x [de-identified] : Patient completed 6/16 fractions for a total 1590 cGy to the right breast

## 2023-07-26 NOTE — HISTORY OF PRESENT ILLNESS
[FreeTextEntry1] : Ms. Mccoy is a 62-year-old woman with right breast cancer.\par \par She felt a palpable mass around the 12:00 periareolar border of the right breast nipple areolar complex in February 2023.\par \par 2/7/23 bilateral mammography and ultrasound: showed a 1.3 cm mass right retroareolar region for which she underwent an ultrasound-guided core biopsy on February 22, 2023. \par \par The final pathology showed an intermediate grade invasive duct cancer which was ER moderately positive between 81 and 90% and MA weak to moderately positive greater than 95% and HER2/gato negative by IHC with a Ki-67 of 11%.\par \par She could not tolerate an MRI so this was not done. Genetic testing was negative.\par \par She underwent a right breast partial mastectomy and sentinel lymph node biopsy with slight mastopexy on May 8, 2023. \par \par The final pathology showed a 2.3 cm, Grade 2, invasive duct cancer with 2 negative right axillary sentinel lymph nodes with free margins which remained ER/MA positive HER2/gato negative with a Ki-67 between 10 and 15%. \par \par Oncotype recurrence score was 11\par \par 6/12/2023  today she is here  to discuss post operative radiotherapy options.  Dr Jessica recommended starting anastrazole after completion of adjuvant RT.

## 2023-07-26 NOTE — REVIEW OF SYSTEMS
[Fatigue: Grade 0] : Fatigue: Grade 0 [Breast Pain: Grade 0] : Breast Pain: Grade 0 [Pruritus: Grade 0] : Pruritus: Grade 0 [Skin Hyperpigmentation: Grade 1 - Hyperpigmentation covering <10% BSA; no psychosocial impact] : Skin Hyperpigmentation: Grade 1 - Hyperpigmentation covering <10% BSA; no psychosocial impact [Dermatitis Radiation: Grade 0] : Dermatitis Radiation: Grade 0

## 2023-07-28 ENCOUNTER — APPOINTMENT (OUTPATIENT)
Dept: HEMATOLOGY ONCOLOGY | Facility: CLINIC | Age: 63
End: 2023-07-28

## 2023-08-01 ENCOUNTER — NON-APPOINTMENT (OUTPATIENT)
Age: 63
End: 2023-08-01

## 2023-08-01 VITALS
RESPIRATION RATE: 16 BRPM | HEART RATE: 87 BPM | DIASTOLIC BLOOD PRESSURE: 91 MMHG | SYSTOLIC BLOOD PRESSURE: 134 MMHG | OXYGEN SATURATION: 98 %

## 2023-08-01 NOTE — DISEASE MANAGEMENT
[Pathological] : TNM Stage: p [IA] : IA [TTNM] : 2 [NTNM] : 0 [MTNM] : x [de-identified] : completed 10/16 fractions to the right breast to a dose of 2650 cGy.

## 2023-08-01 NOTE — HISTORY OF PRESENT ILLNESS
[FreeTextEntry1] : 7/26/2023 Ms. Mccoy presents today for OTV, completed 6/16 fractions to the right breast to a dose of 1590 cGy. She is overall feeling well. She denies any pain, skin is slightly pink in color and she is still hyperpigmented under the breast and she is applying antifungal cream. She denies any itching.   08/01/2023 Ms. Mccoy presents today for OTV. She completed 10/16 fractions to the right breast to a dose of 2650 cGY. She denies any itching of the skin and pain. Her skin is slightly pink in color. She continues to apply calendula dream to her right breasts.

## 2023-08-01 NOTE — PHYSICAL EXAM
[Normal] : well developed, well nourished, in no acute distress [Sclera] : the sclera and conjunctiva were normal [Skin Color & Pigmentation] : normal skin color and pigmentation [Oriented To Time, Place, And Person] : oriented to person, place, and time [de-identified] : no skin changes or edema right breast

## 2023-08-06 NOTE — PHYSICAL EXAM
[General Appearance - Well Developed] : well developed [Sclera] : the sclera and conjunctiva were normal [] : no respiratory distress [Oriented To Time, Place, And Person] : oriented to person, place, and time [de-identified] : hyperpigmentation inframammary folds b/l breasts (chronic) no other skin changes, no edema

## 2023-08-06 NOTE — HISTORY OF PRESENT ILLNESS
[FreeTextEntry1] : 7/26/2023\par Ms. Mccoy presents today for OTV, completed 6/16 fractions to the right breast to a dose of 1590 cGy. She is overall feeling well. She denies any pain, skin is slightly pink in color and she is still hyperpigmented under the breast and she is applying antifungal cream. She denies any itching.

## 2023-08-06 NOTE — DISEASE MANAGEMENT
[Pathological] : TNM Stage: p [IA] : IA [TTNM] : 2 [NTNM] : 0 [MTNM] : x [de-identified] : completed 6/16 fractions to the right breast to a dose of 1590 cGy.

## 2023-08-08 ENCOUNTER — NON-APPOINTMENT (OUTPATIENT)
Age: 63
End: 2023-08-08

## 2023-08-08 VITALS
BODY MASS INDEX: 41.81 KG/M2 | SYSTOLIC BLOOD PRESSURE: 140 MMHG | DIASTOLIC BLOOD PRESSURE: 87 MMHG | OXYGEN SATURATION: 96 % | WEIGHT: 263 LBS | RESPIRATION RATE: 16 BRPM | HEART RATE: 91 BPM

## 2023-08-08 RX ORDER — SILVER SULFADIAZINE 10 MG/G
1 CREAM TOPICAL TWICE DAILY
Qty: 50 | Refills: 0 | Status: ACTIVE | COMMUNITY
Start: 2023-08-08 | End: 1900-01-01

## 2023-08-08 NOTE — DISEASE MANAGEMENT
[Pathological] : TNM Stage: p [TTNM] : 2 [NTNM] : 0 [MTNM] : x [IA] : IA [de-identified] : completed 10/16 fractions to the right breast to a dose of 2650 cGy.

## 2023-08-08 NOTE — HISTORY OF PRESENT ILLNESS
[FreeTextEntry1] : 7/26/2023 Ms. Mccoy presents today for OTV, completed 6/16 fractions to the right breast to a dose of 1590 cGy. She is overall feeling well. She denies any pain, skin is slightly pink in color and she is still hyperpigmented under the breast and she is applying antifungal cream. She denies any itching.   08/01/2023 Ms. Mccoy presents today for OTV. She completed 10/16 fractions to the right breast to a dose of 2650 cGY. She denies any itching of the skin and pain. Her skin is slightly pink in color. She continues to apply calendula dream to her right breasts.   8/8/23 FX 15 today.  A small open area is noted under the right breast and skin is pink and dry.  She has been using Calendula twice per day.  Her appetite has been good

## 2023-08-08 NOTE — DISEASE MANAGEMENT
[Pathological] : TNM Stage: p [IA] : IA [TTNM] : 2 [NTNM] : 0 [MTNM] : x [de-identified] : completed 15/16 fractions to the right breast to a dose of 3975 cGy.

## 2023-08-08 NOTE — PHYSICAL EXAM
[Normal] : well developed, well nourished, in no acute distress [Sclera] : the sclera and conjunctiva were normal [] : no respiratory distress [Oriented To Time, Place, And Person] : oriented to person, place, and time [de-identified] : An RN chaperone was offered however the patient declined to have a chaperone present during the exam.; small patch of moist desquamation right inframammary fold [de-identified] : In wheelchair 2/2 vertigo

## 2023-08-08 NOTE — REVIEW OF SYSTEMS
[Fatigue: Grade 1 - Fatigue relieved by rest] : Fatigue: Grade 1 - Fatigue relieved by rest [Breast Pain: Grade 0] : Breast Pain: Grade 0 [Pruritus: Grade 0] : Pruritus: Grade 0 [Skin Hyperpigmentation: Grade 0] : Skin Hyperpigmentation: Grade 0 [Dermatitis Radiation: Grade 2 - Moderate to brisk erythema; patchy moist desquamation, mostly confined to skin folds and creases; moderate edema] : Dermatitis Radiation: Grade 2 - Moderate to brisk erythema; patchy moist desquamation, mostly confined to skin folds and creases; moderate edema

## 2023-08-14 ENCOUNTER — NON-APPOINTMENT (OUTPATIENT)
Age: 63
End: 2023-08-14

## 2023-08-14 VITALS — HEART RATE: 68 BPM | DIASTOLIC BLOOD PRESSURE: 78 MMHG | SYSTOLIC BLOOD PRESSURE: 123 MMHG

## 2023-08-14 VITALS
HEART RATE: 95 BPM | BODY MASS INDEX: 41.81 KG/M2 | WEIGHT: 263 LBS | RESPIRATION RATE: 16 BRPM | DIASTOLIC BLOOD PRESSURE: 102 MMHG | SYSTOLIC BLOOD PRESSURE: 177 MMHG | OXYGEN SATURATION: 95 %

## 2023-08-15 DIAGNOSIS — R92.8 OTHER ABNORMAL AND INCONCLUSIVE FINDINGS ON DIAGNOSTIC IMAGING OF BREAST: ICD-10-CM

## 2023-08-15 NOTE — VITALS
[Maximal Pain Intensity: 0/10] : 0/10 [90: Able to carry normal activity; minor signs or symptoms of disease.] : 90: Able to carry normal activity; minor signs or symptoms of disease.  [70: Cares for self; unalbe to carry on normal activity or do active work.] : 70: Cares for self; unable to carry on normal activity or do active work.

## 2023-08-15 NOTE — REVIEW OF SYSTEMS
[Pruritus: Grade 1 - Mild or localized; topical intervention indicated] : Pruritus: Grade 1 - Mild or localized; topical intervention indicated [Skin Hyperpigmentation: Grade 1 - Hyperpigmentation covering <10% BSA; no psychosocial impact] : Skin Hyperpigmentation: Grade 1 - Hyperpigmentation covering <10% BSA; no psychosocial impact [Dermatitis Radiation: Grade 1 - Faint erythema or dry desquamation] : Dermatitis Radiation: Grade 1 - Faint erythema or dry desquamation [Fatigue: Grade 1 - Fatigue relieved by rest] : Fatigue: Grade 1 - Fatigue relieved by rest [Breast Pain: Grade 0] : Breast Pain: Grade 0

## 2023-08-15 NOTE — PHYSICAL EXAM
[Normal] : well developed, well nourished, in no acute distress [Sclera] : the sclera and conjunctiva were normal [Oriented To Time, Place, And Person] : oriented to person, place, and time [de-identified] : Moderate erythema and hyperpigmentation of the right breast, small patch of moist desquamation inframammary fold

## 2023-08-15 NOTE — DISEASE MANAGEMENT
[Pathological] : TNM Stage: p [IA] : IA [TTNM] : 2 [NTNM] : 0 [MTNM] : x [de-identified] : completed 16/16 fractions to the right breast to a dose of 4240 and boost 1 of 4 250 cGY cGy.

## 2023-08-15 NOTE — HISTORY OF PRESENT ILLNESS
[FreeTextEntry1] : 7/26/2023 Ms. Mccoy presents today for OTV, completed 6/16 fractions to the right breast to a dose of 1590 cGy. She is overall feeling well. She denies any pain, skin is slightly pink in color and she is still hyperpigmented under the breast and she is applying antifungal cream. She denies any itching.   08/01/2023 Ms. Mccoy presents today for OTV. She completed 10/16 fractions to the right breast to a dose of 2650 cGY. She denies any itching of the skin and pain. Her skin is slightly pink in color. She continues to apply calendula dream to her right breasts.   8/8/23 FX 15 today.  A small open area is noted under the right breast and skin is pink and dry.  She has been using Calendula twice per day.  Her appetite has been good   8/14/23 Feeling well today but did feel anxiety on the TX machine.  Her appetite has been good. Her skin has been red and irritated, so she has been using Silvadene twice per day.  i

## 2023-08-21 ENCOUNTER — APPOINTMENT (OUTPATIENT)
Dept: HEMATOLOGY ONCOLOGY | Facility: CLINIC | Age: 63
End: 2023-08-21
Payer: COMMERCIAL

## 2023-08-21 ENCOUNTER — RESULT REVIEW (OUTPATIENT)
Age: 63
End: 2023-08-21

## 2023-08-21 VITALS
BODY MASS INDEX: 41.45 KG/M2 | HEART RATE: 93 BPM | RESPIRATION RATE: 18 BRPM | SYSTOLIC BLOOD PRESSURE: 146 MMHG | HEIGHT: 66.5 IN | DIASTOLIC BLOOD PRESSURE: 81 MMHG | WEIGHT: 261 LBS | OXYGEN SATURATION: 92 % | TEMPERATURE: 97.7 F

## 2023-08-21 PROCEDURE — 99213 OFFICE O/P EST LOW 20 MIN: CPT | Mod: 25

## 2023-08-21 NOTE — ASSESSMENT
[FreeTextEntry1] : Ms. Mccoy is a 62-year-old postmenopausal female who is referred by Dr. Arjun Kyle for initial consultation for breast cancer.  \par  Patient reports that she palpated a mass in the right breast approximately 3 months ago.  Bilateral mammography on 2/7/2023 showed a suspicious 2.5 x 1.6 x 1.5 cm mass in the right breast. \par   Ultrasound-guided core biopsy on 2/22/2023 showed an intermediate grade invasive ductal carcinoma ER moderately positive (81 to 90%) MO weak to moderately positive (greater than 95% and HER2/gato negative by IHC and Ki-67 11%.  She presented to [place] for surgical opinion which confirmed that the palpable lesion in the right breast was a benign skin finding.  She was scheduled for right partial mastectomy and sentinel lymph node biopsy but was her surgery was canceled by anesthesiology because of finding of? right axis deviation on ECG. \par   She presented to [facility] for surgical second opinion on and on 5/8/2023 underwent right partial mastectomy and sentinel lymph node biopsy.  Pathology showed a 2.3 cm intermediate grade invasive ductal carcinoma with apocrine and micropapillary features, No LVI, ,2 negative right axillary sentinel lymph nodes with free margins ER 81-90% moderate /MO >95% strong  positive HER2/gato 1+/FISH negative, Ki-67 between 10 to 15%.  Oncotype recurrence score was 11. \par  \par  Genetic testing -\par  \par  pT2, N0 , IDC with apocrine and micropapillary features, G2, ER  81-90% moderate, MO>95%, Her2-1+/FISH neg., Ki67-10-15%, oncotype DX RS: 11\par  \par  Plan:\par  \par  f/u with rad-onc  for adjuvant RT\par  discussed about side effect profile of AI and ribociclib \par  f/u after RT  for further discussion of  adjuvant therapy \par  \par  \par

## 2023-08-21 NOTE — HISTORY OF PRESENT ILLNESS
[de-identified] : Ms. Mccoy is a 62-year-old postmenopausal female who is referred by Dr. Arjun Kyle for initial consultation for breast cancer.  \par  Patient reports that she palpated a mass in the right breast approximately 3 months ago.  Bilateral mammography on 2/7/2023 showed a suspicious 2.5 x 1.6 x 1.5 cm mass in the right breast. \par   Ultrasound-guided core biopsy on 2/22/2023 showed an intermediate grade invasive ductal carcinoma ER moderately positive (81 to 90%) WY weak to moderately positive (greater than 95% and HER2/gato negative by IHC and Ki-67 11%.  She presented to [place] for surgical opinion which confirmed that the palpable lesion in the right breast was a benign skin finding.  She was scheduled for right partial mastectomy and sentinel lymph node biopsy but was her surgery was canceled by anesthesiology because of finding of? right axis deviation on ECG. \par   She presented to [facility] for surgical second opinion on and on 5/8/2023 underwent right partial mastectomy and sentinel lymph node biopsy.  Pathology showed a 2.3 cm intermediate grade invasive ductal carcinoma with apocrine and micropapillary features, No LVI, ,2 negative right axillary sentinel lymph nodes with free margins ER 81-90% moderate /WY >95% strong  positive HER2/gato 1+/FISH negative, Ki-67 between 10 to 15%.  Oncotype recurrence score was 11. \par  \par   She underwent genetic testing in March 2023 with [facility] which was reported as negative.  Her family history is significant for mother with breast cancer diagnosed in her early 60s with recurrence at age 79.  Her brother also had a history of melanoma from which she passed away at age 59.  She is of Ashkenazi Jainism ancestry.  [de-identified] : Patient with h/o Menieres disease\par  \par  Discussed [facility] trial with adjuvant ribociclib , anatomic stage II ( T2, N0, G2) -- adjuvant ribociclib for 3 yrs. in addition to endocrine therapy\par  Discussed side effects of ribociclib 400 mg daily 3 weeks on/ 1 week off - neutropenia, liver toxicity, ILD, QT prolongation etc. \par

## 2023-08-21 NOTE — REASON FOR VISIT
[Follow-Up Visit] : a follow-up [Initial Consultation] : an initial consultation [FreeTextEntry2] : Breast Cancer

## 2023-08-21 NOTE — REVIEW OF SYSTEMS
[Diarrhea: Grade 0] : Diarrhea: Grade 0 [Difficulty Walking] : difficulty walking [Anxiety] : anxiety [Negative] : Allergic/Immunologic [FreeTextEntry2] : She is attempting to lose weight [de-identified] : Disequilibrium from Meniere's disease.  Uses a wheelchair when out of the house

## 2023-08-21 NOTE — REVIEW OF SYSTEMS
[Diarrhea: Grade 0] : Diarrhea: Grade 0 [Difficulty Walking] : difficulty walking [Anxiety] : anxiety [Negative] : Allergic/Immunologic [FreeTextEntry2] : She is attempting to lose weight [de-identified] : Disequilibrium from Meniere's disease.  Uses a wheelchair when out of the house

## 2023-08-21 NOTE — PHYSICAL EXAM
[Restricted in physically strenuous activity but ambulatory and able to carry out work of a light or sedentary nature] : Status 1- Restricted in physically strenuous activity but ambulatory and able to carry out work of a light or sedentary nature, e.g., light house work, office work [Normal] : affect appropriate [de-identified] : Rt. breast lumpectomy incision healing well. LEft breast --nl. No axillary adenopathy b/l

## 2023-08-21 NOTE — ASSESSMENT
[FreeTextEntry1] : Ms. Mccoy is a 62-year-old postmenopausal female who is referred by Dr. Arjun Kyle for initial consultation for breast cancer.  \par  Patient reports that she palpated a mass in the right breast approximately 3 months ago.  Bilateral mammography on 2/7/2023 showed a suspicious 2.5 x 1.6 x 1.5 cm mass in the right breast. \par   Ultrasound-guided core biopsy on 2/22/2023 showed an intermediate grade invasive ductal carcinoma ER moderately positive (81 to 90%) CA weak to moderately positive (greater than 95% and HER2/gato negative by IHC and Ki-67 11%.  She presented to [place] for surgical opinion which confirmed that the palpable lesion in the right breast was a benign skin finding.  She was scheduled for right partial mastectomy and sentinel lymph node biopsy but was her surgery was canceled by anesthesiology because of finding of? right axis deviation on ECG. \par   She presented to [facility] for surgical second opinion on and on 5/8/2023 underwent right partial mastectomy and sentinel lymph node biopsy.  Pathology showed a 2.3 cm intermediate grade invasive ductal carcinoma with apocrine and micropapillary features, No LVI, ,2 negative right axillary sentinel lymph nodes with free margins ER 81-90% moderate /CA >95% strong  positive HER2/gato 1+/FISH negative, Ki-67 between 10 to 15%.  Oncotype recurrence score was 11. \par  \par  Genetic testing -\par  \par  pT2, N0 , IDC with apocrine and micropapillary features, G2, ER  81-90% moderate, CA>95%, Her2-1+/FISH neg., Ki67-10-15%, oncotype DX RS: 11\par  \par  Plan:\par  \par  f/u with rad-onc  for adjuvant RT\par  discussed about side effect profile of AI and ribociclib \par  f/u after RT  for further discussion of  adjuvant therapy \par  \par  \par

## 2023-08-21 NOTE — PHYSICAL EXAM
[Restricted in physically strenuous activity but ambulatory and able to carry out work of a light or sedentary nature] : Status 1- Restricted in physically strenuous activity but ambulatory and able to carry out work of a light or sedentary nature, e.g., light house work, office work [Normal] : affect appropriate [de-identified] : Rt. breast lumpectomy incision healing well. LEft breast --nl. No axillary adenopathy b/l

## 2023-08-21 NOTE — HISTORY OF PRESENT ILLNESS
[de-identified] : Ms. Mccoy is a 62-year-old postmenopausal female who is referred by Dr. Arjun Kyle for initial consultation for breast cancer.  \par  Patient reports that she palpated a mass in the right breast approximately 3 months ago.  Bilateral mammography on 2/7/2023 showed a suspicious 2.5 x 1.6 x 1.5 cm mass in the right breast. \par   Ultrasound-guided core biopsy on 2/22/2023 showed an intermediate grade invasive ductal carcinoma ER moderately positive (81 to 90%) WY weak to moderately positive (greater than 95% and HER2/gato negative by IHC and Ki-67 11%.  She presented to [place] for surgical opinion which confirmed that the palpable lesion in the right breast was a benign skin finding.  She was scheduled for right partial mastectomy and sentinel lymph node biopsy but was her surgery was canceled by anesthesiology because of finding of? right axis deviation on ECG. \par   She presented to  for surgical second opinion on and on 5/8/2023 underwent right partial mastectomy and sentinel lymph node biopsy.  Pathology showed a 2.3 cm intermediate grade invasive ductal carcinoma with apocrine and micropapillary features, No LVI, ,2 negative right axillary sentinel lymph nodes with free margins ER 81-90% moderate /WY >95% strong  positive HER2/gato 1+/FISH negative, Ki-67 between 10 to 15%.  Oncotype recurrence score was 11. \par  \par   She underwent genetic testing in March 2023 with [facility] which was reported as negative.  Her family history is significant for mother with breast cancer diagnosed in her early 60s with recurrence at age 79.  Her brother also had a history of melanoma from which she passed away at age 59.  She is of Ashkenazi Confucianist ancestry.  [de-identified] : Patient with h/o Menieres disease\par  \par  Discussed [facility] trial with adjuvant ribociclib , anatomic stage II ( T2, N0, G2) -- adjuvant ribociclib for 3 yrs. in addition to endocrine therapy\par  Discussed side effects of ribociclib 400 mg daily 3 weeks on/ 1 week off - neutropenia, liver toxicity, ILD, QT prolongation etc. \par

## 2023-09-05 NOTE — REVIEW OF SYSTEMS
[Feeling Tired] : not feeling tired [Eyes Itch] : itching of the eyes [Breast Lump] : breast lump [Dizziness] : dizziness [Difficulty Walking] : difficulty walking [Anxiety] : anxiety [Negative] : Endocrine [FreeTextEntry2] : anxiety [de-identified] : balance issues  ??? vertigo ????

## 2023-09-05 NOTE — ASSESSMENT
[FreeTextEntry1] : The patient is a 63-year-old  postmenopausal white female of Ashkenazi Judaism heritage.  She underwent menarche at age 14 and had her first child at age 35.  She underwent menopause at age 53 and never took any hormone replacement therapy.  She has a family history with her mother who had breast cancer in her mid 60s with later recurrence at age 79.  Her brother had melanoma at age 59.  The patient has a significant medical history of anxiety as well as significant balance and vertigo disorder which has been attributed to possible Meniere's disease.  She felt a palpable mass around the 12:00 periareolar border of the right breast nipple areolar complex in 2023 and underwent a bilateral mammography and ultrasound around 2023 at [facility] which showed a suspicious 2.5 x 1.6 x 1.5 cm mass for which she underwent an ultrasound-guided core biopsy on 2023.  The final pathology showed an intermediate grade invasive duct cancer which was ER moderately positive between 81 and 90% and IA weak to moderately positive greater than 95% and HER2/gato negative by IHC with a Ki-67 of 11%.  She could not tolerate an MRI and was seen down at [place] by Dr. Diego Wheeler.  He did perform a directed ultrasound in the patient's right breast upper outer quadrant for palpable density which turned out to be a benign skin finding.  She was then scheduled for a right breast partial mastectomy and sentinel lymph node biopsy and had a lymphoscintigraphy the day before the surgery but on the day of surgery she was canceled due to the lack of cardiology clearance.  She eventually did get her imaging on CD-ROM which was reviewed and downloaded into the PACS system showing the localized right breast 12:00 periareolar cancer.  I did have the pathology reviewed at [facility] which showed the moderately differentiated invasive duct cancer which was ER/IA positive HER2/gato negative. She did undergo genetic testing in 2023 with [facility] which was completely negative. She was seen as a second opinion and was rescheduled for her surgery at [facility] after undergoing preoperative and cardiology clearance.  She underwent a right breast partial mastectomy and sentinel lymph node biopsy with slight mastopexy with a batwing/tissue transfer closure performed on May 8, 2023.  The final pathology showed a 2.3 cm intermediate grade invasive duct cancer with 2 negative right axillary sentinel lymph nodes with free margins which remained ER/IA positive HER2/gato negative with a Ki-67 between 10 and 15%.  This was a pathologic prognostic stage IA breast cancer.  She does have a significant fungal rash on the inframammary folds of both breasts and she was used betamethasone clotrimazole cream on this post operatively.  The tumor was sent out for an Oncotype recurrence score which came back at 11.  She understands the benefits for endocrine therapy as well as the benefits for radiation therapy.  She was seen by Dr. Al for a medical oncology evaluation as well as Dr. Alfaro for a radiation oncology evaluation and underwent a 16 fraction course of external beam radiation to the right breast which finished around 2023.  She was then placed on anastrozole.  She was a possible candidate for the NR[facility] B007 trial but she opted on routine external beam radiation. On exam today, she has healed well from her right breast partial mastectomy with a batwing mastopexy approach and has tolerated radiation therapy well with no evidence of recurrence.  I would like to see her again in another 6 months around 2024.  Her next routine bilateral mammography and ultrasound will be due in 2024 and she was given prescriptions.  She should remain on anastrozole and continue follow-up with Dr. Al.

## 2023-09-05 NOTE — HISTORY OF PRESENT ILLNESS
[FreeTextEntry1] : The patient is a 63-year-old  postmenopausal white female of Ashkenazi Voodoo heritage.  She underwent menarche at age 14 and had her first child at age 35.  She underwent menopause at age 53 and never took any hormone replacement therapy.  She has a family history with her mother had breast cancer in her mid 60s with later recurrence at age 79.  Her brother had melanoma at age 59.  The patient has a significant medical history of anxiety as well as significant balance and vertigo disorder which has been attributed to possible Meniere's disease.  She felt a palpable mass around the 12:00 periareolar border of the right breast nipple areolar complex in 2023 and underwent a bilateral mammography and ultrasound around 2023 at [facility] which showed a suspicious 2.5 x 1.6 x 1.5 cm mass for which she underwent an ultrasound-guided core biopsy on 2023.  The final pathology showed an intermediate grade invasive duct cancer which was ER moderately positive between 81 and 90% and NH weak to moderately positive greater than 95% and HER2/gato negative by IHC with a Ki-67 of 11%.  She could not tolerate an MRI and was seen down at [place] by Dr. Diego Wheeler.  He did perform a directed ultrasound in the patient's right breast upper outer quadrant for palpable density which turned out to be a benign skin finding.  She was then scheduled for a right breast partial mastectomy and sentinel lymph node biopsy and had a lymphoscintigraphy the day before the surgery but on the day of surgery she was canceled due to the lack of cardiology clearance. She did undergo genetic testing in 2023 with [facility] which was completely negative.  She was seen as a second opinion and was rescheduled for her surgery at [facility] after undergoing preoperative and cardiology clearance.  She underwent a right breast partial mastectomy and sentinel lymph node biopsy with slight mastopexy with a batwing/tissue transfer closure performed on May 8, 2023.   The final pathology showed a 2.3 cm intermediate grade invasive duct cancer with 2 negative right axillary sentinel lymph nodes with free margins which remained ER/NH positive HER2/gato negative with a Ki-67 between 10 and 15%.  Oncotype recurrence score was 11 and she was seen by Dr. Al and Dr. Alfaro and received external beam radiation and was placed on anastrozole.  She comes in now for routine follow-up.

## 2023-09-05 NOTE — REASON FOR VISIT
[Follow-Up: _____] : a [unfilled] follow-up visit [FreeTextEntry1] : The patient is a postmenopausal white female of Ashkenazi [place] descent.  She has a family history of breast cancer with her mother had breast cancer in her 60s and a recurrence at age 79.  The patient has past medical history significant for significant balance and vertigo issues possibly secondary to Meniere's disease.  She felt a palpable mass in the right breast 12:00 periareolar region around February 2023.  She underwent a bilateral mammography at [facility] on February 7, 2023 with an ultrasound which showed architectural distortion in the right breast periareolar region and ultrasound showed an irregular 2.5 x 1.6 x 1.5 cm density in the right breast 12:00 region and core biopsy was performed on February 22, 2023 showing an intermediate grade invasive duct cancer which is ER/NV positive HER2/gato negative with a Ki-67 of 11%.  She was initially scheduled for surgery down to [place] with Diego Wheeler but that surgery was canceled due to the lack of cardiology clearance.  She underwent cardiology and preop clearance and then underwent a right breast partial mastectomy and sentinel lymph node biopsy with slight batwing mastopexy and tissue transfer closure performed on May 8, 2023.  Final pathology showed a 2.3 cm intermediate grade invasive duct cancer with 2 negative axillary sentinel lymph nodes and free margins which remained ER/NV positive HER2/gato negative with a Ki-67 between 10 and 15%.  Oncotype score was 11.  She was seen by Dr. Al and Dr. Alfaro postoperatively and underwent external beam radiation and was placed on Arimidex.  She comes in now for routine follow-up.

## 2023-09-05 NOTE — PHYSICAL EXAM
[Normocephalic] : normocephalic [Atraumatic] : atraumatic [EOMI] : extra ocular movement intact [Supple] : supple [No Supraclavicular Adenopathy] : no supraclavicular adenopathy [No Cervical Adenopathy] : no cervical adenopathy [Examined in the supine and seated position] : examined in the supine and seated position [No dominant masses] : no dominant masses in right breast  [No dominant masses] : no dominant masses left breast [No Nipple Retraction] : no left nipple retraction [No Nipple Discharge] : no left nipple discharge [Breast Mass Right Breast ___cm] : no masses [Breast Mass Left Breast ___cm] : no masses [Breast Nipple Inversion] : nipples not inverted [Breast Nipple Flattening] : nipples not flattened [Breast Nipple Retraction] : nipples not retracted [Breast Nipple Fissures] : nipples not fissured [Breast Abnormal Lactation (Galactorrhea)] : no galactorrhea [Breast Abnormal Secretion Bloody Fluid] : no bloody discharge [Breast Abnormal Secretion Serous Fluid] : no serous discharge [Breast Abnormal Secretion Opalescent Fluid] : no milky discharge [No Axillary Lymphadenopathy] : no left axillary lymphadenopathy [No Edema] : no edema [No Rashes] : no rashes [No Ulceration] : no ulceration [de-identified] : On exam, the patient has healed well from her right breast partial mastectomy with a slight batwing/mastopexy approach and sentinel lymph node biopsy with no evidence of recurrence.  She has no axillary, supraclavicular, or cervical adenopathy. [de-identified] : Status post right breast partial mastectomy with slight batwing/mastopexy approach and sentinel lymph node biopsy with external beam radiation and no evidence of recurrence.

## 2023-09-12 ENCOUNTER — APPOINTMENT (OUTPATIENT)
Dept: BREAST CENTER | Facility: CLINIC | Age: 63
End: 2023-09-12
Payer: COMMERCIAL

## 2023-09-12 VITALS
SYSTOLIC BLOOD PRESSURE: 123 MMHG | WEIGHT: 261 LBS | BODY MASS INDEX: 41.45 KG/M2 | DIASTOLIC BLOOD PRESSURE: 80 MMHG | HEIGHT: 66.5 IN

## 2023-09-12 PROCEDURE — 99213 OFFICE O/P EST LOW 20 MIN: CPT

## 2023-09-15 DIAGNOSIS — N63.15 UNSP LUMP IN THE RT BREAST, OVERLAPPING QUADRANTS: ICD-10-CM

## 2023-09-18 ENCOUNTER — APPOINTMENT (OUTPATIENT)
Dept: RADIATION ONCOLOGY | Facility: CLINIC | Age: 63
End: 2023-09-18
Payer: COMMERCIAL

## 2023-09-18 ENCOUNTER — RESULT REVIEW (OUTPATIENT)
Age: 63
End: 2023-09-18

## 2023-09-18 ENCOUNTER — NON-APPOINTMENT (OUTPATIENT)
Age: 63
End: 2023-09-18

## 2023-09-18 ENCOUNTER — APPOINTMENT (OUTPATIENT)
Dept: HEMATOLOGY ONCOLOGY | Facility: CLINIC | Age: 63
End: 2023-09-18
Payer: COMMERCIAL

## 2023-09-18 VITALS
TEMPERATURE: 97.3 F | BODY MASS INDEX: 41.13 KG/M2 | HEIGHT: 66.5 IN | SYSTOLIC BLOOD PRESSURE: 144 MMHG | HEART RATE: 109 BPM | RESPIRATION RATE: 18 BRPM | OXYGEN SATURATION: 95 % | WEIGHT: 259 LBS | DIASTOLIC BLOOD PRESSURE: 89 MMHG

## 2023-09-18 VITALS
OXYGEN SATURATION: 97 % | SYSTOLIC BLOOD PRESSURE: 141 MMHG | BODY MASS INDEX: 41.34 KG/M2 | RESPIRATION RATE: 16 BRPM | HEART RATE: 87 BPM | WEIGHT: 260 LBS | DIASTOLIC BLOOD PRESSURE: 84 MMHG

## 2023-09-18 DIAGNOSIS — N60.19 DIFFUSE CYSTIC MASTOPATHY OF UNSPECIFIED BREAST: ICD-10-CM

## 2023-09-18 PROCEDURE — 99212 OFFICE O/P EST SF 10 MIN: CPT | Mod: 25

## 2023-09-18 PROCEDURE — 99024 POSTOP FOLLOW-UP VISIT: CPT

## 2023-09-24 PROBLEM — N60.19 CYSTIC DISEASE OF BREAST: Status: ACTIVE | Noted: 2023-09-24

## 2023-10-04 ENCOUNTER — NON-APPOINTMENT (OUTPATIENT)
Age: 63
End: 2023-10-04

## 2023-10-19 ENCOUNTER — APPOINTMENT (OUTPATIENT)
Dept: HEMATOLOGY ONCOLOGY | Facility: CLINIC | Age: 63
End: 2023-10-19
Payer: COMMERCIAL

## 2023-10-19 ENCOUNTER — RESULT REVIEW (OUTPATIENT)
Age: 63
End: 2023-10-19

## 2023-10-19 VITALS
TEMPERATURE: 97.3 F | DIASTOLIC BLOOD PRESSURE: 119 MMHG | HEIGHT: 66.5 IN | BODY MASS INDEX: 40.98 KG/M2 | SYSTOLIC BLOOD PRESSURE: 173 MMHG | HEART RATE: 97 BPM | RESPIRATION RATE: 18 BRPM | OXYGEN SATURATION: 92 % | WEIGHT: 258 LBS

## 2023-10-19 PROCEDURE — 99213 OFFICE O/P EST LOW 20 MIN: CPT | Mod: 25

## 2023-11-08 ENCOUNTER — RESULT REVIEW (OUTPATIENT)
Age: 63
End: 2023-11-08

## 2024-01-09 ENCOUNTER — APPOINTMENT (OUTPATIENT)
Dept: HEMATOLOGY ONCOLOGY | Facility: CLINIC | Age: 64
End: 2024-01-09

## 2024-01-10 RX ORDER — METFORMIN HYDROCHLORIDE 625 MG/1
TABLET ORAL
Refills: 0 | Status: ACTIVE | COMMUNITY

## 2024-01-10 RX ORDER — SILVER SULFADIAZINE 10 MG/G
1 CREAM TOPICAL TWICE DAILY
Qty: 1 | Refills: 1 | Status: DISCONTINUED | COMMUNITY
Start: 2023-08-22 | End: 2024-01-10

## 2024-01-10 RX ORDER — TRIAMTERENE 50 MG/1
CAPSULE ORAL
Refills: 0 | Status: ACTIVE | COMMUNITY

## 2024-01-12 NOTE — ASSESSMENT
[FreeTextEntry1] : Ms. Mccoy is a 62-year-old postmenopausal female who is referred by Dr. Arjun Kyle for initial consultation for breast cancer. Patient reports that she palpated a mass in the right breast approximately 3 months ago. Bilateral mammography on 2/7/2023 showed a suspicious 2.5 x 1.6 x 1.5 cm mass in the right breast.  Ultrasound-guided core biopsy on 2/22/2023 showed an intermediate grade invasive ductal carcinoma ER moderately positive (81 to 90%) CO weak to moderately positive (greater than 95% and HER2/gato negative by IHC and Ki-67 11%. She presented to [place] for surgical opinion which confirmed that the palpable lesion in the right breast was a benign skin finding. She was scheduled for right partial mastectomy and sentinel lymph node biopsy but was her surgery was canceled by anesthesiology because of finding of? right axis deviation on ECG.  She presented to [facility] for surgical second opinion on and on 5/8/2023 underwent right partial mastectomy and sentinel lymph node biopsy. Pathology showed a 2.3 cm intermediate grade invasive ductal carcinoma with apocrine and micropapillary features, No LVI, ,2 negative right axillary sentinel lymph nodes with free margins ER 81-90% moderate /CO >95% strong positive HER2/gato 1+/FISH negative, Ki-67 between 10 to 15%. Oncotype recurrence score was 11.  Genetic testing -  pT2, N0 , IDC with apocrine and micropapillary features, G2, ER 81-90% moderate, CO>95%, Her2-1+/FISH neg., Ki67-10-15%, oncotype DX RS: 11 s/p RT anastrozole 9/18/23 BMD 10/2023-- nl  Assessment pT2, N0 , IDC with apocrine and micropapillary features, G2, ER 81-90% moderate, CO>95%, Her2-1+/FISH neg., Ki67-10-15%, oncotype DX RS: 11 Mammogram and u/s 9/2023 -- postoperative and postradiation changes on the right . At 10:00 in the rt. breast in the area of patients skin scar there is probable seroma/fat necrosis. Cysts and cyst clusters are present bilaterally. Iheterogenously dense breasts.   Plan: continue anastrozole 1mg daily. Oscal + D3 Mammogram and sonogram 2/24 -- 6 month follow up MRI ( patient claustrophobic) versus contrast enhanced mammogram per brreast surgery team colonoscopy being scheduled GYN screening discussed  f/u 4 months

## 2024-01-12 NOTE — HISTORY OF PRESENT ILLNESS
[de-identified] : Ms. Mccoy is a 62-year-old postmenopausal female who is referred by Dr. Arjun Kyle for initial consultation for breast cancer. Patient reports that she palpated a mass in the right breast approximately 3 months ago. Bilateral mammography on 2/7/2023 showed a suspicious 2.5 x 1.6 x 1.5 cm mass in the right breast.  Ultrasound-guided core biopsy on 2/22/2023 showed an intermediate grade invasive ductal carcinoma ER moderately positive (81 to 90%) ND weak to moderately positive (greater than 95% and HER2/gato negative by IHC and Ki-67 11%. She presented to [place] for surgical opinion which confirmed that the palpable lesion in the right breast was a benign skin finding. She was scheduled for right partial mastectomy and sentinel lymph node biopsy but was her surgery was canceled by anesthesiology because of finding of? right axis deviation on ECG.  She presented to [facility] for surgical second opinion on and on 5/8/2023 underwent right partial mastectomy and sentinel lymph node biopsy. Pathology showed a 2.3 cm intermediate grade invasive ductal carcinoma with apocrine and micropapillary features, No LVI, ,2 negative right axillary sentinel lymph nodes with free margins ER 81-90% moderate /ND >95% strong positive HER2/gato 1+/FISH negative, Ki-67 between 10 to 15%. Oncotype recurrence score was 11.   She underwent genetic testing in March 2023 with [facility] which was reported as negative. Her family history is significant for mother with breast cancer diagnosed in her early 60s with recurrence at age 79. Her brother also had a history of melanoma from which she passed away at age 59. She is of Ashkenazi Taoist ancestry.   Interval History: Patient with h/o Meniers disease Tolerating anastrozole well. Occasional hotflashes. [de-identified] : Verbal consent obtained for televisit on 1/10/24 Televisit done on 1/10/24 Patient tolerating anastrozole well Recently started metformin

## 2024-02-22 ENCOUNTER — NON-APPOINTMENT (OUTPATIENT)
Age: 64
End: 2024-02-22

## 2024-02-28 ENCOUNTER — NON-APPOINTMENT (OUTPATIENT)
Age: 64
End: 2024-02-28

## 2024-03-13 NOTE — ASSESSMENT
[FreeTextEntry1] : The patient is a 63-year-old  postmenopausal white female of Ashkenazi Sikh heritage.  She underwent menarche at age 14 and had her first child at age 35.  She underwent menopause at age 53 and never took any hormone replacement therapy.  She has a family history with her mother who had breast cancer in her mid 60s with later recurrence at age 79.  Her brother had melanoma at age 59.  The patient has a significant medical history of anxiety as well as significant balance and vertigo disorder which has been attributed to possible Meniere's disease.  She felt a palpable mass around the 12:00 periareolar border of the right breast nipple areolar complex in 2023 and underwent a bilateral mammography and ultrasound around 2023 at [facility] which showed a suspicious 2.5 x 1.6 x 1.5 cm mass for which she underwent an ultrasound-guided core biopsy on 2023.  The final pathology showed an intermediate grade invasive duct cancer which was ER moderately positive between 81 and 90% and PA weak to moderately positive greater than 95% and HER2/gato negative by IHC with a Ki-67 of 11%.  She could not tolerate an MRI and was seen down at [place] by Dr. Diego Wheeler.  He did perform a directed ultrasound in the patient's right breast upper outer quadrant for palpable density which turned out to be a benign skin finding.  She was then scheduled for a right breast partial mastectomy and sentinel lymph node biopsy and had a lymphoscintigraphy the day before the surgery but on the day of surgery she was canceled due to the lack of cardiology clearance.  She eventually did get her imaging on CD-ROM which was reviewed and downloaded into the PACS system showing the localized right breast 12:00 periareolar cancer.  I did have the pathology reviewed at [facility] which showed the moderately differentiated invasive duct cancer which was ER/PA positive HER2/gato negative. She did undergo genetic testing in 2023 with [facility] which was completely negative. She was seen as a second opinion and was rescheduled for her surgery at [facility] after undergoing preoperative and cardiology clearance.  She underwent a right breast partial mastectomy and sentinel lymph node biopsy with slight mastopexy with a batwing/tissue transfer closure performed on May 8, 2023.  The final pathology showed a 2.3 cm intermediate grade invasive duct cancer with 2 negative right axillary sentinel lymph nodes with free margins which remained ER/PA positive HER2/gato negative with a Ki-67 between 10 and 15%.  This was a pathologic prognostic stage IA breast cancer.  She developed a significant fungal rash on the inframammary folds of both breasts postoperatively and she used betamethasone clotrimazole cream with resolution.  The tumor was sent out for an Oncotype recurrence score which came back at 11.  She understood the benefits for endocrine therapy as well as the benefits for radiation therapy.  She was seen by Dr. Al for a medical oncology evaluation as well as Dr. Alfaro for a radiation oncology evaluation and underwent a 16 fraction course of external beam radiation to the right breast which finished around 2023.  She was then placed on anastrozole.  She was a possible candidate for the NRG B007 trial but she opted on routine external beam radiation.  She did undergo a bilateral ultrasound ordered by Dr. Al and reviewed from 2023 which just showed some stable scarring changes in the right breast.  Her last bilateral mammography and ultrasound was reviewed from ????? due in 2024 ??????? which showed ??????.  On exam today, she has healed well from her right breast partial mastectomy with a batwing mastopexy approach and has tolerated radiation therapy well with no evidence of recurrence.  I would like to see her again in another 6 months around 2024.  Her next routine bilateral mammography and ultrasound will be due in ??????? 2025 and she was given prescriptions.  She should remain on anastrozole and continue to follow-up with medical oncology.

## 2024-03-13 NOTE — REVIEW OF SYSTEMS
[Eyes Itch] : itching of the eyes [Breast Lump] : breast lump [Dizziness] : dizziness [Difficulty Walking] : difficulty walking [Anxiety] : anxiety [Negative] : Endocrine [FreeTextEntry2] : anxiety [Feeling Tired] : not feeling tired [de-identified] : balance issues  ??? vertigo ????

## 2024-03-13 NOTE — REASON FOR VISIT
[Follow-Up: _____] : a [unfilled] follow-up visit [FreeTextEntry1] : The patient is a postmenopausal white female of Ashkenazi Church descent.  She has a family history of breast cancer with her mother had breast cancer in her 60s and a recurrence at age 79.  The patient has past medical history significant for significant balance and vertigo issues possibly secondary to Meniere's disease.  She felt a palpable mass in the right breast 12:00 periareolar region around February 2023.  She underwent a bilateral mammography at [facility] on February 7, 2023 with an ultrasound which showed architectural distortion in the right breast periareolar region and ultrasound showed an irregular 2.5 x 1.6 x 1.5 cm density in the right breast 12:00 region and core biopsy was performed on February 22, 2023 showing an intermediate grade invasive duct cancer which is ER/NC positive HER2/gato negative with a Ki-67 of 11%.  She was initially scheduled for surgery down to [place] with Diego Wheeler but that surgery was canceled due to the lack of cardiology clearance.  She underwent cardiology and preop clearance and then underwent a right breast partial mastectomy and sentinel lymph node biopsy with slight batwing mastopexy and tissue transfer closure performed on May 8, 2023.  Final pathology showed a 2.3 cm intermediate grade invasive duct cancer with 2 negative axillary sentinel lymph nodes and free margins which remained ER/NC positive HER2/gato negative with a Ki-67 between 10 and 15%.  Oncotype score was 11.  She was seen by Dr. Al and Dr. Alfaro postoperatively and underwent external beam radiation and was placed on Arimidex.  She comes in now for routine follow-up.

## 2024-03-13 NOTE — PHYSICAL EXAM
[Normocephalic] : normocephalic [Atraumatic] : atraumatic [EOMI] : extra ocular movement intact [Supple] : supple [No Supraclavicular Adenopathy] : no supraclavicular adenopathy [No Cervical Adenopathy] : no cervical adenopathy [No dominant masses] : no dominant masses in right breast  [Examined in the supine and seated position] : examined in the supine and seated position [No Nipple Retraction] : no left nipple retraction [No dominant masses] : no dominant masses left breast [No Nipple Discharge] : no right nipple discharge [Breast Mass Left Breast ___cm] : no masses [Breast Mass Right Breast ___cm] : no masses [No Axillary Lymphadenopathy] : no left axillary lymphadenopathy [No Edema] : no edema [No Ulceration] : no ulceration [No Rashes] : no rashes [Breast Nipple Retraction] : nipples not retracted [Breast Nipple Inversion] : nipples not inverted [Breast Nipple Flattening] : nipples not flattened [Breast Nipple Fissures] : nipples not fissured [Breast Abnormal Lactation (Galactorrhea)] : no galactorrhea [Breast Abnormal Secretion Bloody Fluid] : no bloody discharge [Breast Abnormal Secretion Serous Fluid] : no serous discharge [Breast Abnormal Secretion Opalescent Fluid] : no milky discharge [de-identified] : On exam, the patient has healed well from her right breast partial mastectomy with a slight batwing/mastopexy approach and sentinel lymph node biopsy and then external beam radiation with no evidence of recurrence.  She has no axillary, supraclavicular, or cervical adenopathy. [de-identified] : Status post right breast partial mastectomy with slight batwing/mastopexy approach and sentinel lymph node biopsy with external beam radiation and no evidence of recurrence.

## 2024-03-13 NOTE — HISTORY OF PRESENT ILLNESS
[FreeTextEntry1] : The patient is a 63-year-old  postmenopausal white female of Ashkenazi Worship heritage.  She underwent menarche at age 14 and had her first child at age 35.  She underwent menopause at age 53 and never took any hormone replacement therapy.  She has a family history with her mother had breast cancer in her mid 60s with later recurrence at age 79.  Her brother had melanoma at age 59.  The patient has a significant medical history of anxiety as well as significant balance and vertigo disorder which has been attributed to possible Meniere's disease.  She felt a palpable mass around the 12:00 periareolar border of the right breast nipple areolar complex in 2023 and underwent a bilateral mammography and ultrasound around 2023 at [facility] which showed a suspicious 2.5 x 1.6 x 1.5 cm mass for which she underwent an ultrasound-guided core biopsy on 2023.  The final pathology showed an intermediate grade invasive duct cancer which was ER moderately positive between 81 and 90% and KY weak to moderately positive greater than 95% and HER2/gtao negative by IHC with a Ki-67 of 11%.  She could not tolerate an MRI and was seen down at [place] by Dr. Diego Wheeler.  He did perform a directed ultrasound in the patient's right breast upper outer quadrant for palpable density which turned out to be a benign skin finding.  She was then scheduled for a right breast partial mastectomy and sentinel lymph node biopsy and had a lymphoscintigraphy the day before the surgery but on the day of surgery she was canceled due to the lack of cardiology clearance. She did undergo genetic testing in 2023 with [facility] which was completely negative.  She was seen as a second opinion and was rescheduled for her surgery at [facility] after undergoing preoperative and cardiology clearance.  She underwent a right breast partial mastectomy and sentinel lymph node biopsy with slight mastopexy with a batwing/tissue transfer closure performed on May 8, 2023.   The final pathology showed a 2.3 cm intermediate grade invasive duct cancer with 2 negative right axillary sentinel lymph nodes with free margins which remained ER/KY positive HER2/gato negative with a Ki-67 between 10 and 15%.  Oncotype recurrence score was 11 and she was seen by Dr. Al and Dr. Alfaro and received external beam radiation and was placed on anastrozole.  She comes in now for routine follow-up.

## 2024-03-20 ENCOUNTER — APPOINTMENT (OUTPATIENT)
Dept: BREAST CENTER | Facility: CLINIC | Age: 64
End: 2024-03-20
Payer: COMMERCIAL

## 2024-03-20 ENCOUNTER — APPOINTMENT (OUTPATIENT)
Dept: RADIATION ONCOLOGY | Facility: CLINIC | Age: 64
End: 2024-03-20
Payer: COMMERCIAL

## 2024-03-20 ENCOUNTER — NON-APPOINTMENT (OUTPATIENT)
Age: 64
End: 2024-03-20

## 2024-03-20 VITALS
HEART RATE: 100 BPM | SYSTOLIC BLOOD PRESSURE: 149 MMHG | DIASTOLIC BLOOD PRESSURE: 90 MMHG | OXYGEN SATURATION: 95 % | RESPIRATION RATE: 18 BRPM

## 2024-03-20 VITALS — HEIGHT: 67 IN | WEIGHT: 255 LBS | BODY MASS INDEX: 40.02 KG/M2

## 2024-03-20 DIAGNOSIS — M25.512 PAIN IN LEFT SHOULDER: ICD-10-CM

## 2024-03-20 DIAGNOSIS — Z12.39 ENCOUNTER FOR OTHER SCREENING FOR MALIGNANT NEOPLASM OF BREAST: ICD-10-CM

## 2024-03-20 DIAGNOSIS — Z90.11 ACQUIRED ABSENCE OF RIGHT BREAST AND NIPPLE: ICD-10-CM

## 2024-03-20 DIAGNOSIS — M25.511 PAIN IN RIGHT SHOULDER: ICD-10-CM

## 2024-03-20 DIAGNOSIS — Z85.3 PERSONAL HISTORY OF MALIGNANT NEOPLASM OF BREAST: ICD-10-CM

## 2024-03-20 DIAGNOSIS — Z80.3 FAMILY HISTORY OF MALIGNANT NEOPLASM OF BREAST: ICD-10-CM

## 2024-03-20 PROCEDURE — 99213 OFFICE O/P EST LOW 20 MIN: CPT

## 2024-03-20 RX ORDER — NYSTATIN 100000 [USP'U]/G
100000 CREAM TOPICAL 3 TIMES DAILY
Qty: 1 | Refills: 1 | Status: ACTIVE | COMMUNITY
Start: 2024-03-20 | End: 1900-01-01

## 2024-03-20 NOTE — DISEASE MANAGEMENT
[TTNM] : 2 [NTNM] : 0 [MTNM] : x [de-identified] : completed 16/16 fractions to the right breast to a dose of 4240 and boost 4 of 4 1000cGY cGy. completed August 2023

## 2024-03-20 NOTE — REASON FOR VISIT
[Follow-Up: _____] : a [unfilled] follow-up visit [FreeTextEntry1] : The patient is a postmenopausal white female of Ashkenazi Orthodoxy descent.  She has a family history of breast cancer with her mother had breast cancer in her 60s and a recurrence at age 79.  The patient has past medical history significant for significant balance and vertigo issues possibly secondary to Meniere's disease.  She felt a palpable mass in the right breast 12:00 periareolar region around February 2023.  She underwent a bilateral mammography at [facility] on February 7, 2023 with an ultrasound which showed architectural distortion in the right breast periareolar region and ultrasound showed an irregular 2.5 x 1.6 x 1.5 cm density in the right breast 12:00 region and core biopsy was performed on February 22, 2023 showing an intermediate grade invasive duct cancer which is ER/NM positive HER2/gato negative with a Ki-67 of 11%.  She was initially scheduled for surgery down to [place] with Diego Wheeler but that surgery was canceled due to the lack of cardiology clearance.  She underwent cardiology and preop clearance and then underwent a right breast partial mastectomy and sentinel lymph node biopsy with slight batwing mastopexy and tissue transfer closure performed on May 8, 2023.  Final pathology showed a 2.3 cm intermediate grade invasive duct cancer with 2 negative axillary sentinel lymph nodes and free margins which remained ER/NM positive HER2/gato negative with a Ki-67 between 10 and 15%.  Oncotype score was 11.  She was seen by Dr. Al and Dr. Alfaro postoperatively and underwent external beam radiation and was placed on Arimidex.  She comes in now for routine follow-up.

## 2024-03-20 NOTE — REVIEW OF SYSTEMS
[Eyes Itch] : itching of the eyes [Breast Lump] : breast lump [Dizziness] : dizziness [Difficulty Walking] : difficulty walking [Anxiety] : anxiety [Negative] : Endocrine [FreeTextEntry2] : anxiety [Feeling Tired] : not feeling tired [de-identified] : balance issues  ??? vertigo ????

## 2024-03-20 NOTE — PHYSICAL EXAM
[Normocephalic] : normocephalic [EOMI] : extra ocular movement intact [Atraumatic] : atraumatic [Supple] : supple [No Cervical Adenopathy] : no cervical adenopathy [No Supraclavicular Adenopathy] : no supraclavicular adenopathy [Examined in the supine and seated position] : examined in the supine and seated position [No dominant masses] : no dominant masses in right breast  [No dominant masses] : no dominant masses left breast [No Nipple Retraction] : no left nipple retraction [Breast Mass Right Breast ___cm] : no masses [No Nipple Discharge] : no left nipple discharge [Breast Mass Left Breast ___cm] : no masses [No Axillary Lymphadenopathy] : no left axillary lymphadenopathy [No Rashes] : no rashes [No Edema] : no edema [No Ulceration] : no ulceration [Breast Nipple Inversion] : nipples not inverted [Breast Nipple Retraction] : nipples not retracted [Breast Nipple Fissures] : nipples not fissured [Breast Nipple Flattening] : nipples not flattened [Breast Abnormal Secretion Bloody Fluid] : no bloody discharge [Breast Abnormal Lactation (Galactorrhea)] : no galactorrhea [Breast Abnormal Secretion Opalescent Fluid] : no milky discharge [Breast Abnormal Secretion Serous Fluid] : no serous discharge [de-identified] : On exam, the patient has healed well from her right breast partial mastectomy with a slight batwing/mastopexy approach and sentinel lymph node biopsy and then external beam radiation with no evidence of recurrence.  She has no axillary, supraclavicular, or cervical adenopathy. [de-identified] : Status post right breast partial mastectomy with slight batwing/mastopexy approach and sentinel lymph node biopsy with external beam radiation and no evidence of recurrence.

## 2024-03-20 NOTE — ASSESSMENT
[FreeTextEntry1] : The patient is a 63-year-old  postmenopausal white female of Ashkenazi Oriental orthodox heritage.  She underwent menarche at age 14 and had her first child at age 35.  She underwent menopause at age 53 and never took any hormone replacement therapy.  She has a family history with her mother who had breast cancer in her mid 60s with later recurrence at age 79.  Her brother had melanoma at age 59.  The patient has a significant medical history of anxiety as well as significant balance and vertigo disorder which has been attributed to possible Meniere's disease.  She felt a palpable mass around the 12:00 periareolar border of the right breast nipple areolar complex in 2023 and underwent a bilateral mammography and ultrasound around 2023 at [facility] which showed a suspicious 2.5 x 1.6 x 1.5 cm mass for which she underwent an ultrasound-guided core biopsy on 2023.  The final pathology showed an intermediate grade invasive duct cancer which was ER moderately positive between 81 and 90% and MI weak to moderately positive greater than 95% and HER2/gato negative by IHC with a Ki-67 of 11%.  She could not tolerate an MRI and was seen down at [place] by Dr. Diego Wheeler.  He did perform a directed ultrasound in the patient's right breast upper outer quadrant for palpable density which turned out to be a benign skin finding.  She was then scheduled for a right breast partial mastectomy and sentinel lymph node biopsy and had a lymphoscintigraphy the day before the surgery but on the day of surgery she was canceled due to the lack of cardiology clearance.  She eventually did get her imaging on CD-ROM which was reviewed and downloaded into the PACS system showing the localized right breast 12:00 periareolar cancer.  I did have the pathology reviewed at [facility] which showed the moderately differentiated invasive duct cancer which was ER/MI positive HER2/gato negative. She did undergo genetic testing in 2023 with [facility] which was completely negative. She was seen as a second opinion and was rescheduled for her surgery at [facility] after undergoing preoperative and cardiology clearance.  She underwent a right breast partial mastectomy and sentinel lymph node biopsy with slight mastopexy with a batwing/tissue transfer closure performed on May 8, 2023.  The final pathology showed a 2.3 cm intermediate grade invasive duct cancer with 2 negative right axillary sentinel lymph nodes with free margins which remained ER/MI positive HER2/gato negative with a Ki-67 between 10 and 15%.  This was a pathologic prognostic stage IA breast cancer.  She developed a significant fungal rash on the inframammary folds of both breasts postoperatively and she used betamethasone clotrimazole cream with resolution.  The tumor was sent out for an Oncotype recurrence score which came back at 11.  She understood the benefits for endocrine therapy as well as the benefits for radiation therapy.  She was seen by Dr. Al for a medical oncology evaluation as well as Dr. Alfaro for a radiation oncology evaluation and underwent a 16 fraction course of external beam radiation to the right breast which finished around 2023.  She was then placed on anastrozole.  She was a possible candidate for the NRG B007 trial but she opted on routine external beam radiation.  She did undergo a bilateral ultrasound ordered by Dr. Al and reviewed from 2023 which just showed some stable scarring changes in the right breast.  Her last bilateral mammography and ultrasound was reviewed from 2024 performed at [facility] which showed postop changes in the retroareolar region of the right breast and some left breast cysts but no suspicious findings.  On exam today, she has healed well from her right breast partial mastectomy with a batwing mastopexy approach and has tolerated radiation therapy well with no evidence of recurrence.  I would like to see her again in another 6 months around 2024.  Her next routine bilateral mammography and ultrasound will be due in 2025 and she was given prescriptions.  She should remain on anastrozole and now follows up with Dr. Shaw.

## 2024-03-20 NOTE — PHYSICAL EXAM
[General Appearance - Well Developed] : well developed [Sclera] : the sclera and conjunctiva were normal [] : no respiratory distress [Nondistended] : nondistended [Normal] : oriented to person, place and time, the affect was normal, the mood was normal and not anxious [de-identified] : symmetric, pendulous breasts, supple w/o e/o post-RT effects [de-identified] : discomfort with elevation of right arm [de-identified] : mild hyperpigmentation w/in RT field of the right breast

## 2024-03-20 NOTE — HISTORY OF PRESENT ILLNESS
[FreeTextEntry1] : The patient is a 63-year-old  postmenopausal white female of Ashkenazi Religion heritage.  She underwent menarche at age 14 and had her first child at age 35.  She underwent menopause at age 53 and never took any hormone replacement therapy.  She has a family history with her mother had breast cancer in her mid 60s with later recurrence at age 79.  Her brother had melanoma at age 59.  The patient has a significant medical history of anxiety as well as significant balance and vertigo disorder which has been attributed to possible Meniere's disease.  She felt a palpable mass around the 12:00 periareolar border of the right breast nipple areolar complex in 2023 and underwent a bilateral mammography and ultrasound around 2023 at [facility] which showed a suspicious 2.5 x 1.6 x 1.5 cm mass for which she underwent an ultrasound-guided core biopsy on 2023.  The final pathology showed an intermediate grade invasive duct cancer which was ER moderately positive between 81 and 90% and FL weak to moderately positive greater than 95% and HER2/gato negative by IHC with a Ki-67 of 11%.  She could not tolerate an MRI and was seen down at [place] by Dr. Diego Wheeler.  He did perform a directed ultrasound in the patient's right breast upper outer quadrant for palpable density which turned out to be a benign skin finding.  She was then scheduled for a right breast partial mastectomy and sentinel lymph node biopsy and had a lymphoscintigraphy the day before the surgery but on the day of surgery she was canceled due to the lack of cardiology clearance. She did undergo genetic testing in 2023 with [facility] which was completely negative.  She was seen as a second opinion and was rescheduled for her surgery at [facility] after undergoing preoperative and cardiology clearance.  She underwent a right breast partial mastectomy and sentinel lymph node biopsy with slight mastopexy with a batwing/tissue transfer closure performed on May 8, 2023.   The final pathology showed a 2.3 cm intermediate grade invasive duct cancer with 2 negative right axillary sentinel lymph nodes with free margins which remained ER/FL positive HER2/gato negative with a Ki-67 between 10 and 15%.  Oncotype recurrence score was 11 and she was seen by Dr. Al and Dr. Alfaro and received external beam radiation and was placed on anastrozole.  She now follows up with Dr. Shaw.  She comes in now for routine follow-up.

## 2024-03-20 NOTE — HISTORY OF PRESENT ILLNESS
[FreeTextEntry1] : This is a 63 year female with stage IA IDC of the right breast who is status-post partial mastectomy and SLNBx now s/p RT to the right breast  20 Fxs [id number] cGY completed August 2023  She saw Dr Mack and was started on Arimidex.  Her last f/u with her was 1/9/24 and she will see Med onc again in six months  She will see  Dr Kyle on 3/20/2024   Imaging March 2024 - [facility]: Post treatment changes seen  in the right breast. Again seen is a 5 MM well circumscribed nodule in the lower inner quadrant of the right breast and several asymmetries are seen in the lower inner left breast the largest measures 8mm and appears unchanged compared to prior study.    3/18/24 Today she is here for routine f/u.  She is generally feeling well and is doing well with her Arimidex .  Her appetite has been good and she reports no pain today.  She is still having some ROM issues in the right arm.

## 2024-03-20 NOTE — REVIEW OF SYSTEMS
[Muscle Pain] : muscle pain [Difficulty Walking] : difficulty walking [Negative] : Allergic/Immunologic

## 2024-04-07 NOTE — ASSESSMENT
[FreeTextEntry1] : Ms. Mccoy is a 62-year-old postmenopausal female who is referred by Dr. Arjun Kyle for initial consultation for breast cancer. Patient reports that she palpated a mass in the right breast approximately 3 months ago. Bilateral mammography on 2/7/2023 showed a suspicious 2.5 x 1.6 x 1.5 cm mass in the right breast.  Ultrasound-guided core biopsy on 2/22/2023 showed an intermediate grade invasive ductal carcinoma ER moderately positive (81 to 90%) RI weak to moderately positive (greater than 95% and HER2/gato negative by IHC and Ki-67 11%. She presented to [facility] for surgical opinion which confirmed that the palpable lesion in the right breast was a benign skin finding. She was scheduled for right partial mastectomy and sentinel lymph node biopsy but was her surgery was canceled by anesthesiology because of finding of? right axis deviation on ECG.  She presented to [facility] for surgical second opinion on and on 5/8/2023 underwent right partial mastectomy and sentinel lymph node biopsy. Pathology showed a 2.3 cm intermediate grade invasive ductal carcinoma with apocrine and micropapillary features, No LVI, ,2 negative right axillary sentinel lymph nodes with free margins ER 81-90% moderate /RI >95% strong positive HER2/gato 1+/FISH negative, Ki-67 between 10 to 15%. Oncotype recurrence score was 11.  Genetic testing -  pT2, N0 , IDC with apocrine and micropapillary features, G2, ER 81-90% moderate, RI>95%, Her2-1+/FISH neg., Ki67-10-15%, oncotype DX RS: 11 s/p RT anastrozole 9/18/23 BMD 10/2023-- nl  Assessment pT2, N0 , IDC with apocrine and micropapillary features, G2, ER 81-90% moderate, RI>95%, Her2-1+/FISH neg., Ki67-10-15%, oncotype DX RS: 11 Mammogram and u/s 9/2023 -- postoperative and postradiation changes on the right . At 10:00 in the rt. breast in the area of patients skin scar there is probable seroma/fat necrosis. Cysts and cyst clusters are present bilaterally. Iheterogenously dense breasts.   Plan: continue anastrozole 1mg daily. Oscal + D3 Mammogram and sonogram 2/24 -- 6 month follow up MRI ( patient claustrophobic) versus contrast enhanced mammogram per brreast surgery team colonoscopy being scheduled GYN screening discussed  f/u 4 months

## 2024-04-10 ENCOUNTER — APPOINTMENT (OUTPATIENT)
Dept: HEMATOLOGY ONCOLOGY | Facility: CLINIC | Age: 64
End: 2024-04-10
Payer: COMMERCIAL

## 2024-04-10 ENCOUNTER — RESULT REVIEW (OUTPATIENT)
Age: 64
End: 2024-04-10

## 2024-04-10 VITALS
HEART RATE: 107 BPM | DIASTOLIC BLOOD PRESSURE: 90 MMHG | BODY MASS INDEX: 40.02 KG/M2 | HEIGHT: 67 IN | WEIGHT: 255 LBS | OXYGEN SATURATION: 97 % | TEMPERATURE: 97.7 F | RESPIRATION RATE: 18 BRPM | SYSTOLIC BLOOD PRESSURE: 144 MMHG

## 2024-04-10 DIAGNOSIS — Z17.0 MALIGNANT NEOPLASM OF OVERLAPPING SITES OF RIGHT FEMALE BREAST: ICD-10-CM

## 2024-04-10 DIAGNOSIS — B35.9 DERMATOPHYTOSIS, UNSPECIFIED: ICD-10-CM

## 2024-04-10 DIAGNOSIS — E66.9 OBESITY, UNSPECIFIED: ICD-10-CM

## 2024-04-10 DIAGNOSIS — C50.811 MALIGNANT NEOPLASM OF OVERLAPPING SITES OF RIGHT FEMALE BREAST: ICD-10-CM

## 2024-04-10 PROCEDURE — 99214 OFFICE O/P EST MOD 30 MIN: CPT

## 2024-04-10 RX ORDER — CICLOPIROX OLAMINE 7.7 MG/G
0.77 CREAM TOPICAL DAILY
Qty: 1 | Refills: 2 | Status: ACTIVE | COMMUNITY
Start: 2024-04-10 | End: 1900-01-01

## 2024-04-10 NOTE — HISTORY OF PRESENT ILLNESS
[de-identified] : Ms. Mccoy is a 62-year-old postmenopausal female who is referred by Dr. Arjun Kyle for initial consultation for breast cancer. Patient reports that she palpated a mass in the right breast approximately 3 months ago. Bilateral mammography on 2/7/2023 showed a suspicious 2.5 x 1.6 x 1.5 cm mass in the right breast.  Ultrasound-guided core biopsy on 2/22/2023 showed an intermediate grade invasive ductal carcinoma ER moderately positive (81 to 90%) AL weak to moderately positive (greater than 95% and HER2/gato negative by IHC and Ki-67 11%. She presented to [place] for surgical opinion which confirmed that the palpable lesion in the right breast was a benign skin finding. She was scheduled for right partial mastectomy and sentinel lymph node biopsy but was her surgery was canceled by anesthesiology because of finding of? right axis deviation on ECG.  She presented to [facility] for surgical second opinion on and on 5/8/2023 underwent right partial mastectomy and sentinel lymph node biopsy. Pathology showed a 2.3 cm intermediate grade invasive ductal carcinoma with apocrine and micropapillary features, No LVI, ,2 negative right axillary sentinel lymph nodes with free margins ER 81-90% moderate /AL >95% strong positive HER2/gato 1+/FISH negative, Ki-67 between 10 to 15%. Oncotype recurrence score was 11.   She underwent genetic testing in March 2023 with [facility] which was reported as negative. Her family history is significant for mother with breast cancer diagnosed in her early 60s with recurrence at age 79. Her brother also had a history of melanoma from which she passed away at age 59. She is of Ashkenazi Spiritism ancestry.   Interval History: Patient with h/o Meniers disease Tolerating anastrozole well. Occasional hotflashes. [de-identified] : Verbal consent obtained for televisit on 1/10/24 Televisit done on 1/10/24 Patient tolerating anastrozole well Recently started metformin [Disease: _____________________] : Disease: [unfilled] [T: ___] : T[unfilled] [N: ___] : N[unfilled] [de-identified] : ER/WY >95%,  HER2 neg, Ki 67- 10-15%, Oncotype 11

## 2024-04-10 NOTE — PHYSICAL EXAM
[Fully active, able to carry on all pre-disease performance without restriction] : Status 0 - Fully active, able to carry on all pre-disease performance without restriction [Normal] : affect appropriate [de-identified] : rigth breast - nodule inner upper quadrant ( fat necrosis 11/23 images)

## 2024-04-10 NOTE — REVIEW OF SYSTEMS
[Diarrhea: Grade 0] : Diarrhea: Grade 0 [Negative] : Allergic/Immunologic [FreeTextEntry2] : Imbalance - Meniers

## 2024-06-16 RX ORDER — ANASTROZOLE TABLETS 1 MG/1
1 TABLET ORAL DAILY
Qty: 90 | Refills: 1 | Status: ACTIVE | COMMUNITY
Start: 2023-08-21 | End: 1900-01-01

## 2024-07-01 ENCOUNTER — APPOINTMENT (OUTPATIENT)
Dept: BARIATRICS/WEIGHT MGMT | Facility: CLINIC | Age: 64
End: 2024-07-01

## 2024-09-19 ENCOUNTER — NON-APPOINTMENT (OUTPATIENT)
Age: 64
End: 2024-09-19

## 2024-09-19 NOTE — REASON FOR VISIT
[Follow-Up: _____] : a [unfilled] follow-up visit [FreeTextEntry1] : The patient is a postmenopausal white female of Ashkenazi Gnosticism descent.  She has a family history of breast cancer with her mother had breast cancer in her 60s and a recurrence at age 79.  The patient has past medical history significant for significant balance and vertigo issues possibly secondary to Meniere's disease.  She felt a palpable mass in the right breast 12:00 periareolar region around February 2023.  She underwent a bilateral mammography at [facility] on February 7, 2023 with an ultrasound which showed architectural distortion in the right breast periareolar region and ultrasound showed an irregular 2.5 x 1.6 x 1.5 cm density in the right breast 12:00 region and core biopsy was performed on February 22, 2023 showing an intermediate grade invasive duct cancer which is ER/MD positive HER2/gato negative with a Ki-67 of 11%.  She was initially scheduled for surgery down at [place] with Diego Wheeler but that surgery was canceled due to the lack of cardiology clearance.  She underwent cardiology and preop clearance and then underwent a right breast partial mastectomy and sentinel lymph node biopsy with slight batwing mastopexy and tissue transfer closure performed on May 8, 2023.  Final pathology showed a 2.3 cm intermediate grade invasive duct cancer with 2 negative axillary sentinel lymph nodes and free margins which remained ER/MD positive HER2/gato negative with a Ki-67 between 10 and 15%.  Oncotype score was 11.  She was seen by Dr. Al and Dr. Alfaro postoperatively and underwent external beam radiation and was placed on Arimidex.  She comes in now for routine follow-up. [FreeTextEntry2] : May 8, 2023

## 2024-09-19 NOTE — PHYSICAL EXAM
[Normocephalic] : normocephalic [Atraumatic] : atraumatic [EOMI] : extra ocular movement intact [Supple] : supple [No Supraclavicular Adenopathy] : no supraclavicular adenopathy [No Cervical Adenopathy] : no cervical adenopathy [Examined in the supine and seated position] : examined in the supine and seated position [No dominant masses] : no dominant masses in right breast  [No dominant masses] : no dominant masses left breast [No Nipple Retraction] : no left nipple retraction [No Nipple Discharge] : no left nipple discharge [Breast Mass Right Breast ___cm] : no masses [Breast Mass Left Breast ___cm] : no masses [Breast Nipple Inversion] : nipples not inverted [Breast Nipple Retraction] : nipples not retracted [Breast Nipple Flattening] : nipples not flattened [Breast Nipple Fissures] : nipples not fissured [Breast Abnormal Lactation (Galactorrhea)] : no galactorrhea [Breast Abnormal Secretion Bloody Fluid] : no bloody discharge [Breast Abnormal Secretion Serous Fluid] : no serous discharge [Breast Abnormal Secretion Opalescent Fluid] : no milky discharge [No Axillary Lymphadenopathy] : no left axillary lymphadenopathy [No Edema] : no edema [No Rashes] : no rashes [No Ulceration] : no ulceration [de-identified] : On exam, the patient has healed well from her right breast partial mastectomy with a slight batwing/mastopexy approach and sentinel lymph node biopsy and then external beam radiation with no evidence of recurrence.  She has no axillary, supraclavicular, or cervical adenopathy. [de-identified] : Status post right breast partial mastectomy with slight batwing/mastopexy approach and sentinel lymph node biopsy with external beam radiation and no evidence of recurrence.

## 2024-09-19 NOTE — REVIEW OF SYSTEMS
[Feeling Tired] : not feeling tired [Eyes Itch] : itching of the eyes [Breast Lump] : breast lump [Dizziness] : dizziness [Difficulty Walking] : difficulty walking [Anxiety] : anxiety [Negative] : Endocrine [FreeTextEntry2] : anxiety [de-identified] : balance issues  ??? vertigo ????

## 2024-09-19 NOTE — HISTORY OF PRESENT ILLNESS
[FreeTextEntry1] : The patient is a 64-year-old  postmenopausal white female of Ashkenazi Hindu heritage.  She underwent menarche at age 14 and had her first child at age 35.  She underwent menopause at age 53 and never took any hormone replacement therapy.  She has a family history with her mother had breast cancer in her mid 60s with later recurrence at age 79.  Her brother had melanoma at age 59.  The patient has a significant medical history of anxiety as well as significant balance and vertigo disorder which has been attributed to possible Meniere's disease.  She felt a palpable mass around the 12:00 periareolar border of the right breast nipple areolar complex in 2023 and underwent a bilateral mammography and ultrasound around 2023 at [facility] which showed a suspicious 2.5 x 1.6 x 1.5 cm mass for which she underwent an ultrasound-guided core biopsy on 2023.  The final pathology showed an intermediate grade invasive duct cancer which was ER moderately positive between 81 and 90% and IL weak to moderately positive greater than 95% and HER2/gato negative by IHC with a Ki-67 of 11%.  She could not tolerate an MRI and was seen down at [place] by Dr. Diego Wheeler.  He did perform a directed ultrasound in the patient's right breast upper outer quadrant for palpable density which turned out to be a benign skin finding.  She was then scheduled for a right breast partial mastectomy and sentinel lymph node biopsy and had a lymphoscintigraphy the day before the surgery but on the day of surgery she was canceled due to the lack of cardiology clearance. She did undergo genetic testing in 2023 with [facility] which was completely negative.  She was seen as a second opinion and was rescheduled for her surgery at [facility] after undergoing preoperative and cardiology clearance.  She underwent a right breast partial mastectomy and sentinel lymph node biopsy with slight mastopexy with a batwing/tissue transfer closure performed on May 8, 2023.   The final pathology showed a 2.3 cm intermediate grade invasive duct cancer with 2 negative right axillary sentinel lymph nodes with free margins which remained ER/IL positive HER2/gato negative with a Ki-67 between 10 and 15%.  Oncotype recurrence score was 11 and she was seen by Dr. Al and Dr. Alfaro and received external beam radiation and was placed on anastrozole.  She now follows up with Dr. Shaw.  She comes in now for routine follow-up.

## 2024-09-19 NOTE — ASSESSMENT
[FreeTextEntry1] : The patient is a 64-year-old  postmenopausal white female of Ashkenazi Advent heritage.  She underwent menarche at age 14 and had her first child at age 35.  She underwent menopause at age 53 and never took any hormone replacement therapy.  She has a family history with her mother who had breast cancer in her mid 60s with later recurrence at age 79.  Her brother had melanoma at age 59.  The patient has a significant medical history of anxiety as well as significant balance and vertigo disorder which has been attributed to possible Meniere's disease.  She felt a palpable mass around the 12:00 periareolar border of the right breast nipple areolar complex in 2023 and underwent a bilateral mammography and ultrasound around 2023 at [facility] which showed a suspicious 2.5 x 1.6 x 1.5 cm mass for which she underwent an ultrasound-guided core biopsy on 2023.  The final pathology showed an intermediate grade invasive duct cancer which was ER moderately positive between 81 and 90% and MS weak to moderately positive greater than 95% and HER2/gato negative by IHC with a Ki-67 of 11%.  She could not tolerate an MRI and was seen down at [place] by Dr. Diego Wheeler.  He did perform a directed ultrasound in the patient's right breast upper outer quadrant for palpable density which turned out to be a benign skin finding.  She was then scheduled for a right breast partial mastectomy and sentinel lymph node biopsy and had a lymphoscintigraphy the day before the surgery but on the day of surgery she was canceled due to the lack of cardiology clearance.  She eventually did get her imaging on CD-ROM which was reviewed and downloaded into the PACS system showing the localized right breast 12:00 periareolar cancer.  I did have the pathology reviewed at [facility] which showed the moderately differentiated invasive duct cancer which was ER/MS positive HER2/gato negative. She did undergo genetic testing in 2023 with [facility] which was completely negative. She was seen as a second opinion and was rescheduled for her surgery at [facility] after undergoing preoperative and cardiology clearance.  She underwent a right breast partial mastectomy and sentinel lymph node biopsy with slight mastopexy with a batwing/tissue transfer closure performed on May 8, 2023.  The final pathology showed a 2.3 cm intermediate grade invasive duct cancer with 2 negative right axillary sentinel lymph nodes with free margins which remained ER/MS positive HER2/gato negative with a Ki-67 between 10 and 15%.  This was a pathologic prognostic stage IA breast cancer.  She developed a significant fungal rash on the inframammary folds of both breasts postoperatively and she used betamethasone clotrimazole cream with resolution.  The tumor was sent out for an Oncotype recurrence score which came back at 11.  She understood the benefits for endocrine therapy as well as the benefits for radiation therapy.  She was seen by Dr. Al for a medical oncology evaluation as well as Dr. Alfaro for a radiation oncology evaluation and underwent a 16 fraction course of external beam radiation to the right breast which finished around 2023.  She was then placed on anastrozole.  She was a possible candidate for the NRG B007 trial but she opted on routine external beam radiation.  She did undergo a bilateral ultrasound ordered by Dr. Al and reviewed from 2023 which just showed some stable scarring changes in the right breast.  Her last bilateral mammography and ultrasound was reviewed from 2024 performed at [facility] which showed postop changes in the retroareolar region of the right breast and some left breast cysts but no suspicious findings.  On exam today, she has healed well from her right breast partial mastectomy with a batwing mastopexy approach and has tolerated radiation therapy well with no evidence of recurrence.  I would like to see her again in another 6 months around 2025.  Her next routine bilateral mammography and ultrasound will be due in 2025 and she was given prescriptions.  She should remain on anastrozole and now follows up with Dr. Shaw.